# Patient Record
Sex: MALE | Race: WHITE | Employment: OTHER | URBAN - NONMETROPOLITAN AREA
[De-identification: names, ages, dates, MRNs, and addresses within clinical notes are randomized per-mention and may not be internally consistent; named-entity substitution may affect disease eponyms.]

---

## 2019-01-01 ENCOUNTER — APPOINTMENT (OUTPATIENT)
Dept: GENERAL RADIOLOGY | Age: 79
End: 2019-01-01
Attending: INTERNAL MEDICINE
Payer: COMMERCIAL

## 2019-01-01 ENCOUNTER — HOSPITAL ENCOUNTER (OUTPATIENT)
Age: 79
Setting detail: OUTPATIENT SURGERY
Discharge: OTHER INSTITUTION | End: 2019-10-14
Attending: INTERNAL MEDICINE | Admitting: INTERNAL MEDICINE
Payer: COMMERCIAL

## 2019-01-01 ENCOUNTER — HOSPITAL ENCOUNTER (OUTPATIENT)
Dept: GENERAL RADIOLOGY | Age: 79
Discharge: HOME | End: 2019-10-14
Payer: COMMERCIAL

## 2019-01-01 ENCOUNTER — HOSPITAL ENCOUNTER (OUTPATIENT)
Age: 79
Discharge: NURSING, REHAB OR LONG-TERM CARE | End: 2019-10-16
Attending: INTERNAL MEDICINE | Admitting: INTERNAL MEDICINE
Payer: COMMERCIAL

## 2019-01-01 ENCOUNTER — HOSPITAL ENCOUNTER (INPATIENT)
Age: 79
LOS: 26 days | Discharge: NURSING, REHAB OR LONG-TERM CARE | DRG: 949 | End: 2019-11-11
Attending: PHYSICAL MEDICINE & REHABILITATION | Admitting: PHYSICAL MEDICINE & REHABILITATION
Payer: MEDICARE

## 2019-01-01 ENCOUNTER — APPOINTMENT (OUTPATIENT)
Dept: GENERAL RADIOLOGY | Age: 79
DRG: 871 | End: 2019-01-01
Payer: MEDICARE

## 2019-01-01 ENCOUNTER — APPOINTMENT (OUTPATIENT)
Dept: CT IMAGING | Age: 79
DRG: 871 | End: 2019-01-01
Payer: MEDICARE

## 2019-01-01 ENCOUNTER — APPOINTMENT (OUTPATIENT)
Dept: GENERAL RADIOLOGY | Age: 79
DRG: 949 | End: 2019-01-01
Attending: PHYSICAL MEDICINE & REHABILITATION
Payer: MEDICARE

## 2019-01-01 ENCOUNTER — HOSPITAL ENCOUNTER (OUTPATIENT)
Age: 79
Discharge: HOME | End: 2019-10-14
Attending: INTERNAL MEDICINE | Admitting: INTERNAL MEDICINE
Payer: COMMERCIAL

## 2019-01-01 ENCOUNTER — OFFICE VISIT (OUTPATIENT)
Dept: CARDIOLOGY CLINIC | Age: 79
End: 2019-01-01
Payer: MEDICARE

## 2019-01-01 ENCOUNTER — HOSPITAL ENCOUNTER (INPATIENT)
Age: 79
LOS: 7 days | Discharge: NURSING, REHAB OR LONG-TERM CARE | DRG: 871 | End: 2019-11-25
Attending: EMERGENCY MEDICINE | Admitting: INTERNAL MEDICINE
Payer: MEDICARE

## 2019-01-01 ENCOUNTER — APPOINTMENT (OUTPATIENT)
Dept: ULTRASOUND IMAGING | Age: 79
End: 2019-01-01
Attending: INTERNAL MEDICINE
Payer: COMMERCIAL

## 2019-01-01 VITALS
WEIGHT: 143.9 LBS | DIASTOLIC BLOOD PRESSURE: 74 MMHG | OXYGEN SATURATION: 94 % | TEMPERATURE: 97.9 F | HEIGHT: 72 IN | RESPIRATION RATE: 18 BRPM | SYSTOLIC BLOOD PRESSURE: 124 MMHG | HEART RATE: 88 BPM | BODY MASS INDEX: 19.49 KG/M2

## 2019-01-01 VITALS
OXYGEN SATURATION: 91 % | HEIGHT: 72 IN | RESPIRATION RATE: 18 BRPM | SYSTOLIC BLOOD PRESSURE: 124 MMHG | WEIGHT: 142.1 LBS | HEART RATE: 118 BPM | DIASTOLIC BLOOD PRESSURE: 61 MMHG | TEMPERATURE: 97.9 F | BODY MASS INDEX: 19.25 KG/M2

## 2019-01-01 VITALS — HEART RATE: 102 BPM | DIASTOLIC BLOOD PRESSURE: 68 MMHG | SYSTOLIC BLOOD PRESSURE: 128 MMHG

## 2019-01-01 DIAGNOSIS — L98.9 SKIN LESION OF SCALP: Chronic | ICD-10-CM

## 2019-01-01 DIAGNOSIS — E43 SEVERE MALNUTRITION (HCC): ICD-10-CM

## 2019-01-01 DIAGNOSIS — R91.8 INFILTRATE OF RIGHT LUNG PRESENT ON CHEST X-RAY: Primary | ICD-10-CM

## 2019-01-01 DIAGNOSIS — R09.02 OXYGEN DESATURATION: ICD-10-CM

## 2019-01-01 DIAGNOSIS — F07.81 ENCEPHALOPATHY, TRAUMATIC: ICD-10-CM

## 2019-01-01 DIAGNOSIS — K94.23 GASTROSTOMY SITE LEAK (HCC): ICD-10-CM

## 2019-01-01 DIAGNOSIS — S06.5XAA SDH (SUBDURAL HEMATOMA): Primary | ICD-10-CM

## 2019-01-01 DIAGNOSIS — S06.5XAA SDH (SUBDURAL HEMATOMA): ICD-10-CM

## 2019-01-01 DIAGNOSIS — R06.02 SOB (SHORTNESS OF BREATH): Primary | ICD-10-CM

## 2019-01-01 DIAGNOSIS — I45.5 SINUS PAUSE: ICD-10-CM

## 2019-01-01 LAB
A/G RATIO: 0.8 (ref 1.5–2.5)
A/G RATIO: 1 (ref 1.5–2.5)
A/G RATIO: 1.1 (ref 1.5–2.5)
ABSOLUTE BASO #: 0 /CMM (ref 0–200)
ABSOLUTE BASO #: 100 /CMM (ref 0–200)
ABSOLUTE BASO #: 100 /CMM (ref 0–200)
ABSOLUTE EOS #: 0 /CMM (ref 0–500)
ABSOLUTE EOS #: 0 /CMM (ref 0–500)
ABSOLUTE EOS #: 100 /CMM (ref 0–500)
ABSOLUTE LYMPH #: 1000 /CMM (ref 1000–4800)
ABSOLUTE LYMPH #: 400 /CMM (ref 1000–4800)
ABSOLUTE LYMPH #: 600 /CMM (ref 1000–4800)
ABSOLUTE LYMPH #: 700 /CMM (ref 1000–4800)
ABSOLUTE LYMPH #: 800 /CMM (ref 1000–4800)
ABSOLUTE LYMPH #: 900 /CMM (ref 1000–4800)
ABSOLUTE MONO #: 400 /CMM (ref 0–800)
ABSOLUTE MONO #: 800 /CMM (ref 0–800)
ABSOLUTE MONO #: 800 /CMM (ref 0–800)
ABSOLUTE MONO #: 900 /CMM (ref 0–800)
ABSOLUTE NEUT #: 3500 /CMM (ref 1800–7700)
ABSOLUTE NEUT #: 3800 /CMM (ref 1800–7700)
ABSOLUTE NEUT #: 4000 /CMM (ref 1800–7700)
ABSOLUTE NEUT #: 6600 /CMM (ref 1800–7700)
ABSOLUTE NEUT #: ABNORMAL /CMM (ref 1800–7700)
ABSOLUTE NEUT #: ABNORMAL /CMM (ref 1800–7700)
ALBUMIN SERPL-MCNC: 3 GM/DL (ref 3.5–5)
ALBUMIN SERPL-MCNC: 3.2 G/DL (ref 3.5–5.1)
ALBUMIN SERPL-MCNC: 3.2 GM/DL (ref 3.5–5)
ALBUMIN SERPL-MCNC: 3.2 GM/DL (ref 3.5–5)
ALBUMIN SERPL-MCNC: 3.3 GM/DL (ref 3.5–5)
ALBUMIN SERPL-MCNC: 3.4 G/DL (ref 3.5–5.1)
ALBUMIN SERPL-MCNC: 3.5 G/DL (ref 3.5–5.1)
ALBUMIN SERPL-MCNC: 3.5 GM/DL (ref 3.5–5)
ALBUMIN SERPL-MCNC: 3.7 GM/DL (ref 3.5–5)
ALBUMIN SERPL-MCNC: 3.8 G/DL (ref 3.5–5.1)
ALLEN TEST: NORMAL
ALP BLD-CCNC: 151 IU/L (ref 41–137)
ALP BLD-CCNC: 158 IU/L (ref 41–137)
ALP BLD-CCNC: 171 IU/L (ref 41–137)
ALP BLD-CCNC: 82 U/L (ref 38–126)
ALP BLD-CCNC: 94 U/L (ref 38–126)
ALP BLD-CCNC: 96 IU/L (ref 41–137)
ALP BLD-CCNC: 96 U/L (ref 38–126)
ALP BLD-CCNC: 98 IU/L (ref 41–137)
ALP BLD-CCNC: 98 U/L (ref 38–126)
ALP BLD-CCNC: 99 IU/L (ref 41–137)
ALT SERPL-CCNC: 17 U/L (ref 11–66)
ALT SERPL-CCNC: 21 U/L (ref 11–66)
ALT SERPL-CCNC: 23 IU/L (ref 10–40)
ALT SERPL-CCNC: 23 IU/L (ref 10–40)
ALT SERPL-CCNC: 25 IU/L (ref 10–40)
ALT SERPL-CCNC: 40 U/L (ref 11–66)
ALT SERPL-CCNC: 45 U/L (ref 11–66)
ALT SERPL-CCNC: 67 IU/L (ref 10–40)
ALT SERPL-CCNC: 77 IU/L (ref 10–40)
ALT SERPL-CCNC: 95 IU/L (ref 10–40)
ANA SCREEN: NEGATIVE
ANION GAP SERPL CALCULATED.3IONS-SCNC: 11 MEQ/L (ref 8–16)
ANION GAP SERPL CALCULATED.3IONS-SCNC: 12 MEQ/L (ref 8–16)
ANION GAP SERPL CALCULATED.3IONS-SCNC: 13 MEQ/L (ref 8–16)
ANION GAP SERPL CALCULATED.3IONS-SCNC: 14 MEQ/L (ref 8–16)
ANION GAP SERPL CALCULATED.3IONS-SCNC: 14 MEQ/L (ref 8–16)
ANION GAP SERPL CALCULATED.3IONS-SCNC: 15 MEQ/L (ref 8–16)
ANION GAP SERPL CALCULATED.3IONS-SCNC: 17 MEQ/L (ref 8–16)
ANION GAP SERPL CALCULATED.3IONS-SCNC: 18 MEQ/L (ref 8–16)
ANION GAP SERPL CALCULATED.3IONS-SCNC: 19 MEQ/L (ref 8–16)
ANION GAP SERPL CALCULATED.3IONS-SCNC: 7 MMOL/L (ref 4–12)
ANION GAP SERPL CALCULATED.3IONS-SCNC: 7 MMOL/L (ref 4–12)
ANION GAP SERPL CALCULATED.3IONS-SCNC: 8 MMOL/L (ref 4–12)
ANION GAP SERPL CALCULATED.3IONS-SCNC: 9 MMOL/L (ref 4–12)
ANION GAP SERPL CALCULATED.3IONS-SCNC: 9 MMOL/L (ref 4–12)
APPEARANCE: ABNORMAL
AST SERPL-CCNC: 16 U/L (ref 5–40)
AST SERPL-CCNC: 19 IU/L (ref 15–41)
AST SERPL-CCNC: 20 IU/L (ref 15–41)
AST SERPL-CCNC: 20 U/L (ref 5–40)
AST SERPL-CCNC: 21 IU/L (ref 15–41)
AST SERPL-CCNC: 26 U/L (ref 5–40)
AST SERPL-CCNC: 30 U/L (ref 5–40)
AST SERPL-CCNC: 40 IU/L (ref 15–41)
AST SERPL-CCNC: 54 IU/L (ref 15–41)
AST SERPL-CCNC: 57 IU/L (ref 15–41)
BACTERIA: ABNORMAL /HPF
BASE EXCESS ARTERIAL: 1 MMOL/L (ref -2–3)
BASOPHILS # BLD: 0.4 %
BASOPHILS ABSOLUTE: 0 THOU/MM3 (ref 0–0.1)
BASOPHILS RELATIVE PERCENT: 0.2 % (ref 0–2)
BASOPHILS RELATIVE PERCENT: 0.2 % (ref 0–2)
BASOPHILS RELATIVE PERCENT: 0.8 % (ref 0–2)
BASOPHILS RELATIVE PERCENT: 0.8 % (ref 0–2)
BASOPHILS RELATIVE PERCENT: 0.9 % (ref 0–2)
BASOPHILS RELATIVE PERCENT: 1.1 % (ref 0–2)
BILIRUB SERPL-MCNC: 0.3 MG/DL (ref 0.3–1.2)
BILIRUB SERPL-MCNC: 0.4 MG/DL (ref 0.2–1)
BILIRUB SERPL-MCNC: 0.5 MG/DL (ref 0.2–1)
BILIRUB SERPL-MCNC: 0.5 MG/DL (ref 0.2–1)
BILIRUB SERPL-MCNC: 0.7 MG/DL (ref 0.2–1)
BILIRUB SERPL-MCNC: 0.7 MG/DL (ref 0.2–1)
BILIRUB SERPL-MCNC: 0.7 MG/DL (ref 0.3–1.2)
BILIRUB SERPL-MCNC: 1.3 MG/DL (ref 0.2–1)
BILIRUBIN DIRECT: 0.1 MG/DL (ref 0.1–0.2)
BILIRUBIN DIRECT: 0.2 MG/DL (ref 0.1–0.2)
BILIRUBIN DIRECT: 0.3 MG/DL (ref 0.1–0.2)
BILIRUBIN URINE: NEGATIVE
BLOOD CULTURE, ROUTINE: NORMAL
BLOOD, URINE: ABNORMAL
BUN BLDV-MCNC: 13 MG/DL (ref 7–22)
BUN BLDV-MCNC: 14 MG/DL (ref 7–20)
BUN BLDV-MCNC: 14 MG/DL (ref 7–22)
BUN BLDV-MCNC: 15 MG/DL
BUN BLDV-MCNC: 18 MG/DL (ref 7–22)
BUN BLDV-MCNC: 19 MG/DL (ref 7–20)
BUN BLDV-MCNC: 20 MG/DL (ref 7–22)
BUN BLDV-MCNC: 20 MG/DL (ref 7–22)
BUN BLDV-MCNC: 25 MG/DL (ref 7–20)
BUN BLDV-MCNC: 25 MG/DL (ref 7–22)
BUN BLDV-MCNC: 26 MG/DL (ref 7–20)
BUN BLDV-MCNC: 27 MG/DL (ref 7–20)
BUN BLDV-MCNC: 36 MG/DL (ref 7–22)
BUN BLDV-MCNC: 69 MG/DL (ref 7–22)
BUN BLDV-MCNC: 70 MG/DL (ref 7–22)
BUN BLDV-MCNC: 84 MG/DL (ref 7–22)
BUN BLDV-MCNC: 88 MG/DL (ref 7–22)
C DIFF TOXIN/ANTIGEN: NEGATIVE
CALCIUM SERPL-MCNC: 8.5 MG/DL (ref 8.5–10.5)
CALCIUM SERPL-MCNC: 8.6 MG/DL (ref 8.5–10.5)
CALCIUM SERPL-MCNC: 8.6 MG/DL (ref 8.8–10.5)
CALCIUM SERPL-MCNC: 8.7 MG/DL (ref 8.5–10.5)
CALCIUM SERPL-MCNC: 9.1 MG/DL
CALCIUM SERPL-MCNC: 9.1 MG/DL (ref 8.8–10.5)
CALCIUM SERPL-MCNC: 9.2 MG/DL (ref 8.5–10.5)
CALCIUM SERPL-MCNC: 9.2 MG/DL (ref 8.5–10.5)
CALCIUM SERPL-MCNC: 9.2 MG/DL (ref 8.8–10.5)
CALCIUM SERPL-MCNC: 9.3 MG/DL (ref 8.5–10.5)
CALCIUM SERPL-MCNC: 9.4 MG/DL (ref 8.5–10.5)
CALCIUM SERPL-MCNC: 9.4 MG/DL (ref 8.8–10.5)
CALCIUM SERPL-MCNC: 9.6 MG/DL (ref 8.5–10.5)
CALCIUM SERPL-MCNC: 9.7 MG/DL (ref 8.5–10.5)
CALCIUM SERPL-MCNC: 9.7 MG/DL (ref 8.5–10.5)
CALCIUM SERPL-MCNC: 9.8 MG/DL (ref 8.8–10.5)
CASTS 2: ABNORMAL /LPF
CASTS UA: ABNORMAL /LPF
CERULOPLASMIN: 38 MG/DL (ref 19–31)
CHARACTER, URINE: ABNORMAL
CHARACTER, URINE: CLEAR
CHLORIDE BLD-SCNC: 102 MEQ/L (ref 98–111)
CHLORIDE BLD-SCNC: 102 MEQ/L (ref 98–111)
CHLORIDE BLD-SCNC: 103 MEQ/L (ref 101–111)
CHLORIDE BLD-SCNC: 103 MEQ/L (ref 98–111)
CHLORIDE BLD-SCNC: 103 MEQ/L (ref 98–111)
CHLORIDE BLD-SCNC: 104 MMOL/L
CHLORIDE BLD-SCNC: 105 MEQ/L (ref 98–111)
CHLORIDE BLD-SCNC: 106 MEQ/L (ref 101–111)
CHLORIDE BLD-SCNC: 106 MEQ/L (ref 101–111)
CHLORIDE BLD-SCNC: 107 MEQ/L (ref 101–111)
CHLORIDE BLD-SCNC: 107 MEQ/L (ref 98–111)
CHLORIDE BLD-SCNC: 107 MEQ/L (ref 98–111)
CHLORIDE BLD-SCNC: 110 MEQ/L (ref 98–111)
CHLORIDE BLD-SCNC: 110 MEQ/L (ref 98–111)
CHLORIDE BLD-SCNC: 114 MEQ/L (ref 98–111)
CHLORIDE BLD-SCNC: 120 MEQ/L (ref 98–111)
CHLORIDE BLD-SCNC: 99 MEQ/L (ref 101–111)
CHLORIDE BLD-SCNC: 99 MEQ/L (ref 98–111)
CHLORIDE, URINE: 45 MEQ/L
CLOSTRIDIUM DIFFICILE, PCR: NEGATIVE
CO2: 17 MEQ/L (ref 23–33)
CO2: 18 MEQ/L (ref 23–33)
CO2: 19 MEQ/L (ref 23–33)
CO2: 19 MEQ/L (ref 23–33)
CO2: 20 MEQ/L (ref 23–33)
CO2: 21 MEQ/L (ref 23–33)
CO2: 21 MEQ/L (ref 23–33)
CO2: 22 MEQ/L (ref 23–33)
CO2: 22 MEQ/L (ref 23–33)
CO2: 23 MEQ/L (ref 21–32)
CO2: 23 MEQ/L (ref 23–33)
CO2: 24 MEQ/L (ref 21–32)
CO2: 24 MEQ/L (ref 21–32)
CO2: 24 MEQ/L (ref 23–33)
CO2: 24 MMOL/L
CO2: 25 MEQ/L (ref 21–32)
CO2: 26 MEQ/L (ref 21–32)
COLOR: ABNORMAL
COLOR: YELLOW
CREAT SERPL-MCNC: 0.46 MG/DL (ref 0.6–1.3)
CREAT SERPL-MCNC: 0.47 MG/DL (ref 0.6–1.3)
CREAT SERPL-MCNC: 0.5 MG/DL (ref 0.4–1.2)
CREAT SERPL-MCNC: 0.58 MG/DL (ref 0.6–1.3)
CREAT SERPL-MCNC: 0.59 MG/DL (ref 0.6–1.3)
CREAT SERPL-MCNC: 0.6 MG/DL (ref 0.4–1.2)
CREAT SERPL-MCNC: 0.64 MG/DL (ref 0.6–1.3)
CREAT SERPL-MCNC: 0.7 MG/DL (ref 0.4–1.2)
CREAT SERPL-MCNC: 0.7 MG/DL (ref 0.4–1.2)
CREAT SERPL-MCNC: 0.8 MG/DL
CREAT SERPL-MCNC: 1 MG/DL (ref 0.4–1.2)
CREAT SERPL-MCNC: 1.2 MG/DL (ref 0.4–1.2)
CREAT SERPL-MCNC: 1.6 MG/DL (ref 0.4–1.2)
CREAT SERPL-MCNC: 2.2 MG/DL (ref 0.4–1.2)
CREAT SERPL-MCNC: 3.6 MG/DL (ref 0.4–1.2)
CREATININE CLEARANCE: >60
CRYSTALS, UA: ABNORMAL
DEVICE: NORMAL
DRAWN BY: NORMAL
EOSINOPHIL # BLD: 0.6 %
EOSINOPHILS ABSOLUTE: 0 THOU/MM3 (ref 0–0.4)
EOSINOPHILS RELATIVE PERCENT: 0 % (ref 0–6)
EOSINOPHILS RELATIVE PERCENT: 0.5 % (ref 0–6)
EOSINOPHILS RELATIVE PERCENT: 0.6 % (ref 0–6)
EOSINOPHILS RELATIVE PERCENT: 1 % (ref 0–6)
EOSINOPHILS RELATIVE PERCENT: 2.1 % (ref 0–6)
EOSINOPHILS RELATIVE PERCENT: 2.3 % (ref 0–6)
EPITHELIAL CELLS, UA: ABNORMAL /HPF
ERYTHROCYTE [DISTWIDTH] IN BLOOD BY AUTOMATED COUNT: 12.9 % (ref 11.5–14.5)
ERYTHROCYTE [DISTWIDTH] IN BLOOD BY AUTOMATED COUNT: 13.1 % (ref 11.5–14.5)
ERYTHROCYTE [DISTWIDTH] IN BLOOD BY AUTOMATED COUNT: 13.2 % (ref 11.5–14.5)
ERYTHROCYTE [DISTWIDTH] IN BLOOD BY AUTOMATED COUNT: 13.4 % (ref 11.5–14.5)
ERYTHROCYTE [DISTWIDTH] IN BLOOD BY AUTOMATED COUNT: 13.4 % (ref 11.5–14.5)
ERYTHROCYTE [DISTWIDTH] IN BLOOD BY AUTOMATED COUNT: 13.5 % (ref 11.5–14.5)
ERYTHROCYTE [DISTWIDTH] IN BLOOD BY AUTOMATED COUNT: 13.6 % (ref 11.5–14.5)
ERYTHROCYTE [DISTWIDTH] IN BLOOD BY AUTOMATED COUNT: 13.6 % (ref 11.5–14.5)
ERYTHROCYTE [DISTWIDTH] IN BLOOD BY AUTOMATED COUNT: 13.8 % (ref 11.5–14.5)
ERYTHROCYTE [DISTWIDTH] IN BLOOD BY AUTOMATED COUNT: 14.3 % (ref 11.5–14.5)
ERYTHROCYTE [DISTWIDTH] IN BLOOD BY AUTOMATED COUNT: 42.4 FL (ref 35–45)
ERYTHROCYTE [DISTWIDTH] IN BLOOD BY AUTOMATED COUNT: 44.3 FL (ref 35–45)
ERYTHROCYTE [DISTWIDTH] IN BLOOD BY AUTOMATED COUNT: 44.4 FL (ref 35–45)
ERYTHROCYTE [DISTWIDTH] IN BLOOD BY AUTOMATED COUNT: 44.4 FL (ref 35–45)
ERYTHROCYTE [DISTWIDTH] IN BLOOD BY AUTOMATED COUNT: 44.8 FL (ref 35–45)
ERYTHROCYTE [DISTWIDTH] IN BLOOD BY AUTOMATED COUNT: 45.9 FL (ref 35–45)
ERYTHROCYTE [DISTWIDTH] IN BLOOD BY AUTOMATED COUNT: 46.7 FL (ref 35–45)
ERYTHROCYTE [DISTWIDTH] IN BLOOD BY AUTOMATED COUNT: 47.4 FL (ref 35–45)
ERYTHROCYTE [DISTWIDTH] IN BLOOD BY AUTOMATED COUNT: 47.5 FL (ref 35–45)
ERYTHROCYTE [DISTWIDTH] IN BLOOD BY AUTOMATED COUNT: 49.2 FL (ref 35–45)
FERRITIN: 574 NG/ML (ref 24–336)
FIO2: 0.28
FLU A ANTIGEN: NEGATIVE
FLU B ANTIGEN: NEGATIVE
GFR CALCULATED: 93
GFR SERPL CREATININE-BSD FRML MDRD: 16 ML/MIN/1.73M2
GFR SERPL CREATININE-BSD FRML MDRD: 29 ML/MIN/1.73M2
GFR SERPL CREATININE-BSD FRML MDRD: 42 ML/MIN/1.73M2
GFR SERPL CREATININE-BSD FRML MDRD: 58 ML/MIN/1.73M2
GFR SERPL CREATININE-BSD FRML MDRD: 72 ML/MIN/1.73M2
GFR SERPL CREATININE-BSD FRML MDRD: > 90 ML/MIN/1.73M2
GLUCOSE BLD-MCNC: 100 MG/DL (ref 70–108)
GLUCOSE BLD-MCNC: 102 MG/DL (ref 70–108)
GLUCOSE BLD-MCNC: 105 MG/DL (ref 70–108)
GLUCOSE BLD-MCNC: 105 MG/DL (ref 70–108)
GLUCOSE BLD-MCNC: 106 MG/DL (ref 70–108)
GLUCOSE BLD-MCNC: 108 MG/DL (ref 70–108)
GLUCOSE BLD-MCNC: 109 MG/DL (ref 70–108)
GLUCOSE BLD-MCNC: 110 MG/DL (ref 70–108)
GLUCOSE BLD-MCNC: 111 MG/DL (ref 70–108)
GLUCOSE BLD-MCNC: 111 MG/DL (ref 70–108)
GLUCOSE BLD-MCNC: 118 MG/DL (ref 70–108)
GLUCOSE BLD-MCNC: 121 MG/DL (ref 70–108)
GLUCOSE BLD-MCNC: 122 MG/DL (ref 70–108)
GLUCOSE BLD-MCNC: 123 MG/DL (ref 70–108)
GLUCOSE BLD-MCNC: 124 MG/DL (ref 70–108)
GLUCOSE BLD-MCNC: 135 MG/DL (ref 70–108)
GLUCOSE BLD-MCNC: 135 MG/DL (ref 70–108)
GLUCOSE BLD-MCNC: 142 MG/DL
GLUCOSE BLD-MCNC: 162 MG/DL (ref 70–108)
GLUCOSE URINE: NEGATIVE
GLUCOSE URINE: NEGATIVE MG/DL
GLUCOSE: 102 MG/DL (ref 70–110)
GLUCOSE: 111 MG/DL (ref 70–110)
GLUCOSE: 125 MG/DL (ref 70–110)
GLUCOSE: 155 MG/DL (ref 70–110)
GLUCOSE: 82 MG/DL (ref 70–110)
HAV AB SERPL IA-ACNC: REACTIVE
HAV IGM SER IA-ACNC: NONREACTIVE
HBV SURFACE AB TITR SER: NONREACTIVE {TITER}
HCO3: 25.7 MMOL/L (ref 22–26)
HCT VFR BLD CALC: 28.8 % (ref 40–49)
HCT VFR BLD CALC: 29.4 % (ref 40–49)
HCT VFR BLD CALC: 31.9 % (ref 40–49)
HCT VFR BLD CALC: 32.2 % (ref 40–49)
HCT VFR BLD CALC: 32.8 % (ref 42–52)
HCT VFR BLD CALC: 33.9 % (ref 42–52)
HCT VFR BLD CALC: 34.7 % (ref 42–52)
HCT VFR BLD CALC: 35.2 % (ref 42–52)
HCT VFR BLD CALC: 35.9 % (ref 40–49)
HCT VFR BLD CALC: 36.2 % (ref 42–52)
HCT VFR BLD CALC: 36.9 % (ref 42–52)
HCT VFR BLD CALC: 37 % (ref 40–49)
HCT VFR BLD CALC: 37.2 % (ref 42–52)
HCT VFR BLD CALC: 38.4 % (ref 42–52)
HCV AB: NONREACTIVE
HEMOGLOBIN: 10.2 GM/DL (ref 14–18)
HEMOGLOBIN: 10.5 GM/DL (ref 13.5–16.5)
HEMOGLOBIN: 10.5 GM/DL (ref 14–18)
HEMOGLOBIN: 10.6 GM/DL (ref 13.5–16.5)
HEMOGLOBIN: 10.6 GM/DL (ref 14–18)
HEMOGLOBIN: 11.1 GM/DL (ref 14–18)
HEMOGLOBIN: 11.2 GM/DL (ref 14–18)
HEMOGLOBIN: 11.2 GM/DL (ref 14–18)
HEMOGLOBIN: 11.3 GM/DL (ref 14–18)
HEMOGLOBIN: 11.7 GM/DL (ref 14–18)
HEMOGLOBIN: 11.8 GM/DL (ref 13.5–16.5)
HEMOGLOBIN: 11.8 GM/DL (ref 14–18)
HEMOGLOBIN: 12.1 GM/DL (ref 13.5–16.5)
HEMOGLOBIN: 12.1 GM/DL (ref 14–18)
HEMOGLOBIN: 9.7 GM/DL (ref 13.5–16.5)
HEMOGLOBIN: 9.8 GM/DL (ref 13.5–16.5)
HEPATITIS B CORE TOTAL ANTIBODY: NONREACTIVE
HEPATITIS B SURFACE ANTIGEN: NONREACTIVE
IMMATURE GRANS (ABS): 0.05 THOU/MM3 (ref 0–0.07)
IMMATURE GRANULOCYTES: 0.7 %
IRON SATURATION: 5 % (ref 20–50)
IRON, SERUM: 18 MCG/DL (ref 45–182)
IRON, SERUM: 20 MCG/DL (ref 45–182)
KETONES, URINE: NEGATIVE
LACTIC ACID: 1.8 MMOL/L (ref 0.5–2.2)
LEUKOCYTE ESTERASE, URINE: ABNORMAL
LEUKOCYTE ESTERASE, URINE: NEGATIVE
LEUKOCYTES, UA: ABNORMAL
LV EF: 45 %
LVEF MODALITY: NORMAL
LYMPHOCYTES # BLD: 12 %
LYMPHOCYTES ABSOLUTE: 0.8 THOU/MM3 (ref 1–4.8)
LYMPHOCYTES RELATIVE PERCENT: 15.1 % (ref 15–45)
LYMPHOCYTES RELATIVE PERCENT: 17.3 % (ref 15–45)
LYMPHOCYTES RELATIVE PERCENT: 20.2 % (ref 15–45)
LYMPHOCYTES RELATIVE PERCENT: 3 % (ref 15–45)
LYMPHOCYTES RELATIVE PERCENT: 5.5 % (ref 15–45)
LYMPHOCYTES RELATIVE PERCENT: 7.1 % (ref 15–45)
MAGNESIUM: 2.3 MG/DL (ref 1.6–2.4)
MAGNESIUM: 2.3 MG/DL (ref 1.8–2.5)
MCH RBC QN AUTO: 28.3 PG (ref 26–33)
MCH RBC QN AUTO: 28.3 PG (ref 26–33)
MCH RBC QN AUTO: 28.4 PG (ref 26–33)
MCH RBC QN AUTO: 28.9 PG (ref 26–33)
MCH RBC QN AUTO: 28.9 PG (ref 26–33)
MCH RBC QN AUTO: 29.2 PG (ref 26–33)
MCH RBC QN AUTO: 29.2 PG (ref 26–33)
MCH RBC QN AUTO: 29.4 PG (ref 26–33)
MCH RBC QN AUTO: 29.6 PG (ref 26–33)
MCH RBC QN AUTO: 30 PG (ref 26–33)
MCH RBC QN AUTO: 30.1 PG (ref 27.5–33)
MCH RBC QN AUTO: 30.3 PG (ref 27.5–33)
MCH RBC QN AUTO: 30.8 PG (ref 27.5–33)
MCH RBC QN AUTO: 31.2 PG (ref 27.5–33)
MCHC RBC AUTO-ENTMCNC: 30.3 GM/DL (ref 32.2–35.5)
MCHC RBC AUTO-ENTMCNC: 30.4 GM/DL (ref 32.2–35.5)
MCHC RBC AUTO-ENTMCNC: 31.1 GM/DL (ref 32.2–35.5)
MCHC RBC AUTO-ENTMCNC: 31.2 GM/DL (ref 32.2–35.5)
MCHC RBC AUTO-ENTMCNC: 31.3 GM/DL (ref 32.2–35.5)
MCHC RBC AUTO-ENTMCNC: 31.5 GM/DL (ref 32.2–35.5)
MCHC RBC AUTO-ENTMCNC: 31.8 GM/DL (ref 32.2–35.5)
MCHC RBC AUTO-ENTMCNC: 32.8 GM/DL (ref 33–36)
MCHC RBC AUTO-ENTMCNC: 32.9 GM/DL (ref 33–36)
MCHC RBC AUTO-ENTMCNC: 33 GM/DL (ref 33–36)
MCHC RBC AUTO-ENTMCNC: 33 GM/DL (ref 33–36)
MCHC RBC AUTO-ENTMCNC: 33.4 GM/DL (ref 33–36)
MCHC RBC AUTO-ENTMCNC: 33.5 GM/DL (ref 32.2–35.5)
MCHC RBC AUTO-ENTMCNC: 33.7 GM/DL (ref 33–36)
MCV RBC AUTO: 86.1 FL (ref 80–94)
MCV RBC AUTO: 90.6 FL (ref 80–94)
MCV RBC AUTO: 91.3 CU MIC (ref 80–97)
MCV RBC AUTO: 91.4 FL (ref 80–94)
MCV RBC AUTO: 91.7 CU MIC (ref 80–97)
MCV RBC AUTO: 91.7 CU MIC (ref 80–97)
MCV RBC AUTO: 91.9 CU MIC (ref 80–97)
MCV RBC AUTO: 92.4 CU MIC (ref 80–97)
MCV RBC AUTO: 92.7 CU MIC (ref 80–97)
MCV RBC AUTO: 92.8 FL (ref 80–94)
MCV RBC AUTO: 93.2 FL (ref 80–94)
MCV RBC AUTO: 93.5 FL (ref 80–94)
MCV RBC AUTO: 93.5 FL (ref 80–94)
MCV RBC AUTO: 93.9 FL (ref 80–94)
MCV RBC AUTO: 94.4 FL (ref 80–94)
MCV RBC AUTO: 95.3 FL (ref 80–94)
MISCELLANEOUS 2: ABNORMAL
MITOCHONDRIAL ANTIBODY: <0.1 U
MONOCYTES # BLD: 11.6 %
MONOCYTES ABSOLUTE: 0.8 THOU/MM3 (ref 0.4–1.3)
MONOCYTES RELATIVE PERCENT: 6.3 % (ref 2–10)
MONOCYTES RELATIVE PERCENT: 6.6 % (ref 2–10)
MONOCYTES RELATIVE PERCENT: 6.8 % (ref 2–10)
MONOCYTES RELATIVE PERCENT: 7.9 % (ref 2–10)
MONOCYTES RELATIVE PERCENT: 8.1 % (ref 2–10)
MONOCYTES RELATIVE PERCENT: 9.7 % (ref 2–10)
MRSA SCREEN RT-PCR: NEGATIVE
MRSA SCREEN: NORMAL
MUCUS: PRESENT
NEUTROPHILS RELATIVE PERCENT: 69.8 % (ref 40–70)
NEUTROPHILS RELATIVE PERCENT: 73.2 % (ref 40–70)
NEUTROPHILS RELATIVE PERCENT: 73.6 % (ref 40–70)
NEUTROPHILS RELATIVE PERCENT: 81.8 % (ref 40–70)
NEUTROPHILS RELATIVE PERCENT: 87 % (ref 40–70)
NEUTROPHILS RELATIVE PERCENT: 90.5 % (ref 40–70)
NITRITE, URINE: NEGATIVE
NITRITE, URINE: POSITIVE
NUCLEATED RBCS: 0 /100 WBC
NUCLEATED RBCS: 0.1 /100 WBC
NUCLEATED RED BLOOD CELLS: 0 /100 WBC
O2 SAT, ARTERIAL: 97 % (ref 95–98)
ORGANISM: ABNORMAL
OSMOLALITY CALCULATION: 306.5 MOSMOL/KG (ref 275–300)
PCO2: 38 MMHG (ref 35–45)
PDW BLD-RTO: 13.2 % (ref 12–16)
PDW BLD-RTO: 13.4 % (ref 12–16)
PDW BLD-RTO: 13.8 % (ref 12–16)
PDW BLD-RTO: 13.8 % (ref 12–16)
PDW BLD-RTO: 13.9 % (ref 12–16)
PDW BLD-RTO: 14 % (ref 12–16)
PH UA: 5 (ref 5–9)
PH UA: 5.5 (ref 5–9)
PH UA: 5.5 (ref 5–9)
PH UA: 7.5 (ref 5–9)
PH, URINE: 7 (ref 5–8)
PH: 7.43 (ref 7.35–7.45)
PHOSPHORUS: 4.2 MG/DL (ref 2.4–4.7)
PLATELET # BLD: 229 THOU/MM3 (ref 130–400)
PLATELET # BLD: 238 THOU/MM3 (ref 130–400)
PLATELET # BLD: 238 THOU/MM3 (ref 130–400)
PLATELET # BLD: 270 THOU/MM3 (ref 130–400)
PLATELET # BLD: 290 THOU/MM3 (ref 130–400)
PLATELET # BLD: 308 THOU/MM3 (ref 130–400)
PLATELET # BLD: 312 THOU/MM3 (ref 130–400)
PLATELET # BLD: 315 THOU/MM3 (ref 130–400)
PLATELET # BLD: 317 THOU/MM3 (ref 130–400)
PLATELET # BLD: 340 THOU/MM3 (ref 130–400)
PLATELET # BLD: 343 TH/CMM (ref 150–400)
PLATELET # BLD: 349 TH/CMM (ref 150–400)
PLATELET # BLD: 357 TH/CMM (ref 150–400)
PLATELET # BLD: 661 TH/CMM (ref 150–400)
PLATELET # BLD: 759 TH/CMM (ref 150–400)
PLATELET # BLD: 780 TH/CMM (ref 150–400)
PMV BLD AUTO: 8.6 FL (ref 9.4–12.4)
PMV BLD AUTO: 8.7 FL (ref 9.4–12.4)
PMV BLD AUTO: 8.8 FL (ref 9.4–12.4)
PMV BLD AUTO: 8.8 FL (ref 9.4–12.4)
PMV BLD AUTO: 8.9 FL (ref 9.4–12.4)
PMV BLD AUTO: 9.1 FL (ref 9.4–12.4)
PMV BLD AUTO: 9.3 FL (ref 9.4–12.4)
PMV BLD AUTO: 9.5 FL (ref 9.4–12.4)
PMV BLD AUTO: 9.5 FL (ref 9.4–12.4)
PMV BLD AUTO: 9.6 FL (ref 9.4–12.4)
PO2: 85 MMHG (ref 80–105)
POTASSIUM REFLEX MAGNESIUM: 4.1 MEQ/L (ref 3.5–5.2)
POTASSIUM SERPL-SCNC: 3.4 MEQ/L (ref 3.5–5.2)
POTASSIUM SERPL-SCNC: 3.5 MEQ/L (ref 3.5–5.2)
POTASSIUM SERPL-SCNC: 3.5 MEQ/L (ref 3.6–5)
POTASSIUM SERPL-SCNC: 3.6 MEQ/L (ref 3.5–5.2)
POTASSIUM SERPL-SCNC: 3.8 MEQ/L (ref 3.5–5.2)
POTASSIUM SERPL-SCNC: 4 MEQ/L (ref 3.6–5)
POTASSIUM SERPL-SCNC: 4.1 MEQ/L (ref 3.5–5.2)
POTASSIUM SERPL-SCNC: 4.1 MEQ/L (ref 3.5–5.2)
POTASSIUM SERPL-SCNC: 4.1 MEQ/L (ref 3.6–5)
POTASSIUM SERPL-SCNC: 4.2 MEQ/L (ref 3.5–5.2)
POTASSIUM SERPL-SCNC: 4.2 MEQ/L (ref 3.5–5.2)
POTASSIUM SERPL-SCNC: 4.2 MEQ/L (ref 3.6–5)
POTASSIUM SERPL-SCNC: 4.3 MEQ/L (ref 3.5–5.2)
POTASSIUM SERPL-SCNC: 4.3 MEQ/L (ref 3.5–5.2)
POTASSIUM SERPL-SCNC: 4.4 MEQ/L (ref 3.5–5.2)
POTASSIUM SERPL-SCNC: 4.4 MMOL/L
POTASSIUM SERPL-SCNC: 4.6 MEQ/L (ref 3.5–5.2)
POTASSIUM SERPL-SCNC: 4.6 MEQ/L (ref 3.5–5.2)
POTASSIUM SERPL-SCNC: 4.6 MEQ/L (ref 3.6–5)
POTASSIUM, URINE: 23.7 MEQ/L
PROTEIN UA: 100
PROTEIN UA: 30
PROTEIN UA: ABNORMAL
PROTEIN UA: NEGATIVE
PROTEIN, URINE: ABNORMAL
RBC # BLD: 3.11 MIL/CMM (ref 4.5–6)
RBC # BLD: 3.19 MIL/CMM (ref 4.5–6)
RBC # BLD: 3.48 MIL/CMM (ref 4.5–6)
RBC # BLD: 3.53 MIL/CMM (ref 4.5–6)
RBC # BLD: 3.59 MILL/MM3 (ref 4.7–6.1)
RBC # BLD: 3.71 MILL/MM3 (ref 4.7–6.1)
RBC # BLD: 3.73 MILL/MM3 (ref 4.7–6.1)
RBC # BLD: 3.74 MILL/MM3 (ref 4.7–6.1)
RBC # BLD: 3.75 MILL/MM3 (ref 4.7–6.1)
RBC # BLD: 3.87 MILL/MM3 (ref 4.7–6.1)
RBC # BLD: 3.87 MILL/MM3 (ref 4.7–6.1)
RBC # BLD: 3.92 MIL/CMM (ref 4.5–6)
RBC # BLD: 4.01 MILL/MM3 (ref 4.7–6.1)
RBC # BLD: 4.03 MIL/CMM (ref 4.5–6)
RBC # BLD: 4.09 MILL/MM3 (ref 4.7–6.1)
RBC # BLD: 4.12 MILL/MM3 (ref 4.7–6.1)
RBC URINE: >100 /HPF
RBC URINE: ABNORMAL /HPF
REASON FOR REJECTION: NORMAL
REJECTED TEST: NORMAL
RENAL EPITHELIAL, UA: ABNORMAL
SEG NEUTROPHILS: 74.7 %
SEGMENTED NEUTROPHILS ABSOLUTE COUNT: 5.1 THOU/MM3 (ref 1.8–7.7)
SITE: NORMAL
SMOOTH MUSCLE ANTIBODY: NEGATIVE
SODIUM BLD-SCNC: 133 MEQ/L (ref 135–145)
SODIUM BLD-SCNC: 135 MEQ/L (ref 135–145)
SODIUM BLD-SCNC: 136 MEQ/L (ref 135–145)
SODIUM BLD-SCNC: 136 MEQ/L (ref 135–145)
SODIUM BLD-SCNC: 136 MMOL/L
SODIUM BLD-SCNC: 137 MEQ/L (ref 135–145)
SODIUM BLD-SCNC: 137 MEQ/L (ref 135–145)
SODIUM BLD-SCNC: 138 MEQ/L (ref 135–145)
SODIUM BLD-SCNC: 139 MEQ/L (ref 135–145)
SODIUM BLD-SCNC: 139 MEQ/L (ref 135–145)
SODIUM BLD-SCNC: 140 MEQ/L (ref 135–145)
SODIUM BLD-SCNC: 141 MEQ/L (ref 135–145)
SODIUM BLD-SCNC: 141 MEQ/L (ref 135–145)
SODIUM BLD-SCNC: 143 MEQ/L (ref 135–145)
SODIUM BLD-SCNC: 144 MEQ/L (ref 135–145)
SODIUM BLD-SCNC: 145 MEQ/L (ref 135–145)
SODIUM BLD-SCNC: 150 MEQ/L (ref 135–145)
SODIUM BLD-SCNC: 153 MEQ/L (ref 135–145)
SODIUM URINE: 79 MEQ/L
SPECIFIC GRAVITY, URINE: 1.01 (ref 1–1.03)
SPECIFIC GRAVITY, URINE: 1.01 (ref 1–1.03)
SPECIFIC GRAVITY, URINE: 1.02 (ref 1–1.03)
SQUAMOUS EPITHELIAL: ABNORMAL /HPF
T4 TOTAL: 9.6 UG/DL (ref 4.5–12)
TOTAL PROTEIN: 6.5 G/DL (ref 6.2–8)
TOTAL PROTEIN: 6.6 G/DL (ref 6.1–8)
TOTAL PROTEIN: 6.7 G/DL (ref 6.2–8)
TOTAL PROTEIN: 6.9 G/DL (ref 6.1–8)
TOTAL PROTEIN: 6.9 G/DL (ref 6.2–8)
TOTAL PROTEIN: 7 G/DL (ref 6.1–8)
TOTAL PROTEIN: 7.1 G/DL (ref 6.2–8)
TOTAL PROTEIN: 7.2 G/DL (ref 6.1–8)
TOTAL PROTEIN: 7.2 G/DL (ref 6.2–8)
TOTAL PROTEIN: 7.3 G/DL (ref 6.2–8)
TRANSFERRIN: 219 MG/DL (ref 180–329)
TSH SERPL DL<=0.05 MIU/L-ACNC: 1.52 MCIU/ML (ref 0.49–4.67)
URINALYSIS REFLEX: YES
URINE CULTURE REFLEX: ABNORMAL
URINE CULTURE REFLEX: NORMAL
URINE HGB: ABNORMAL
UROBILINOGEN, URINE: 0.2 EU/DL (ref 0–1)
UROBILINOGEN, URINE: 1 EU/DL (ref 0–1)
UROBILINOGEN, URINE: ABNORMAL (ref 0.2–1)
VANCOMYCIN RESISTANT ENTEROCOCCUS: NEGATIVE
VITAMIN D 25-HYDROXY: 28 NG/ML (ref 30–100)
VRE CULTURE: NORMAL
WBC # BLD: 12.9 TH/CMM (ref 4.4–10.5)
WBC # BLD: 13 TH/CMM (ref 4.4–10.5)
WBC # BLD: 3.8 THOU/MM3 (ref 4.8–10.8)
WBC # BLD: 4.2 THOU/MM3 (ref 4.8–10.8)
WBC # BLD: 4.9 THOU/MM3 (ref 4.8–10.8)
WBC # BLD: 5 TH/CMM (ref 4.4–10.5)
WBC # BLD: 5 THOU/MM3 (ref 4.8–10.8)
WBC # BLD: 5.1 TH/CMM (ref 4.4–10.5)
WBC # BLD: 5.1 THOU/MM3 (ref 4.8–10.8)
WBC # BLD: 5.4 TH/CMM (ref 4.4–10.5)
WBC # BLD: 5.5 THOU/MM3 (ref 4.8–10.8)
WBC # BLD: 5.6 THOU/MM3 (ref 4.8–10.8)
WBC # BLD: 6.1 THOU/MM3 (ref 4.8–10.8)
WBC # BLD: 6.1 THOU/MM3 (ref 4.8–10.8)
WBC # BLD: 6.8 THOU/MM3 (ref 4.8–10.8)
WBC # BLD: 8 TH/CMM (ref 4.4–10.5)
WBC UA: > 100 /HPF
WBC UA: ABNORMAL /HPF
WBC URINE: ABNORMAL /HPF
YEAST: ABNORMAL

## 2019-01-01 PROCEDURE — 6360000002 HC RX W HCPCS: Performed by: PHYSICAL MEDICINE & REHABILITATION

## 2019-01-01 PROCEDURE — 1180000000 HC REHAB R&B

## 2019-01-01 PROCEDURE — 6360000002 HC RX W HCPCS: Performed by: NURSE PRACTITIONER

## 2019-01-01 PROCEDURE — 80048 BASIC METABOLIC PNL TOTAL CA: CPT

## 2019-01-01 PROCEDURE — 71045 X-RAY EXAM CHEST 1 VIEW: CPT

## 2019-01-01 PROCEDURE — 97530 THERAPEUTIC ACTIVITIES: CPT

## 2019-01-01 PROCEDURE — 6370000000 HC RX 637 (ALT 250 FOR IP): Performed by: PHYSICAL MEDICINE & REHABILITATION

## 2019-01-01 PROCEDURE — 36415 COLL VENOUS BLD VENIPUNCTURE: CPT

## 2019-01-01 PROCEDURE — 2580000003 HC RX 258: Performed by: INTERNAL MEDICINE

## 2019-01-01 PROCEDURE — 97116 GAIT TRAINING THERAPY: CPT

## 2019-01-01 PROCEDURE — 1111F DSCHRG MED/CURRENT MED MERGE: CPT | Performed by: PHYSICIAN ASSISTANT

## 2019-01-01 PROCEDURE — 6370000000 HC RX 637 (ALT 250 FOR IP): Performed by: NURSE PRACTITIONER

## 2019-01-01 PROCEDURE — 2709999900 HC NON-CHARGEABLE SUPPLY

## 2019-01-01 PROCEDURE — 6370000000 HC RX 637 (ALT 250 FOR IP): Performed by: INTERNAL MEDICINE

## 2019-01-01 PROCEDURE — 6360000002 HC RX W HCPCS: Performed by: INTERNAL MEDICINE

## 2019-01-01 PROCEDURE — 3609013300 HC EGD TUBE PLACEMENT: Performed by: INTERNAL MEDICINE

## 2019-01-01 PROCEDURE — 85027 COMPLETE CBC AUTOMATED: CPT

## 2019-01-01 PROCEDURE — 97110 THERAPEUTIC EXERCISES: CPT

## 2019-01-01 PROCEDURE — 71275 CT ANGIOGRAPHY CHEST: CPT

## 2019-01-01 PROCEDURE — 92526 ORAL FUNCTION THERAPY: CPT

## 2019-01-01 PROCEDURE — 74230 X-RAY XM SWLNG FUNCJ C+: CPT

## 2019-01-01 PROCEDURE — 92507 TX SP LANG VOICE COMM INDIV: CPT

## 2019-01-01 PROCEDURE — 6360000004 HC RX CONTRAST MEDICATION: Performed by: INTERNAL MEDICINE

## 2019-01-01 PROCEDURE — 97535 SELF CARE MNGMENT TRAINING: CPT

## 2019-01-01 PROCEDURE — 87040 BLOOD CULTURE FOR BACTERIA: CPT

## 2019-01-01 PROCEDURE — 80053 COMPREHEN METABOLIC PANEL: CPT

## 2019-01-01 PROCEDURE — 87086 URINE CULTURE/COLONY COUNT: CPT

## 2019-01-01 PROCEDURE — G8428 CUR MEDS NOT DOCUMENT: HCPCS | Performed by: PHYSICIAN ASSISTANT

## 2019-01-01 PROCEDURE — 6370000000 HC RX 637 (ALT 250 FOR IP): Performed by: PHYSICIAN ASSISTANT

## 2019-01-01 PROCEDURE — 97542 WHEELCHAIR MNGMENT TRAINING: CPT

## 2019-01-01 PROCEDURE — 2580000003 HC RX 258

## 2019-01-01 PROCEDURE — 1200000003 HC TELEMETRY R&B

## 2019-01-01 PROCEDURE — 97127 HC SP THER IVNTJ W/FOCUS COG FUNCJ: CPT

## 2019-01-01 PROCEDURE — 87186 SC STD MICRODIL/AGAR DIL: CPT

## 2019-01-01 PROCEDURE — 94760 N-INVAS EAR/PLS OXIMETRY 1: CPT

## 2019-01-01 PROCEDURE — 99232 SBSQ HOSP IP/OBS MODERATE 35: CPT | Performed by: NURSE PRACTITIONER

## 2019-01-01 PROCEDURE — 83735 ASSAY OF MAGNESIUM: CPT

## 2019-01-01 PROCEDURE — 81001 URINALYSIS AUTO W/SCOPE: CPT

## 2019-01-01 PROCEDURE — 87804 INFLUENZA ASSAY W/OPTIC: CPT

## 2019-01-01 PROCEDURE — 93000 ELECTROCARDIOGRAM COMPLETE: CPT | Performed by: PHYSICIAN ASSISTANT

## 2019-01-01 PROCEDURE — 97166 OT EVAL MOD COMPLEX 45 MIN: CPT

## 2019-01-01 PROCEDURE — 85025 COMPLETE CBC W/AUTO DIFF WBC: CPT

## 2019-01-01 PROCEDURE — 92610 EVALUATE SWALLOWING FUNCTION: CPT

## 2019-01-01 PROCEDURE — 2580000003 HC RX 258: Performed by: PHYSICAL MEDICINE & REHABILITATION

## 2019-01-01 PROCEDURE — 99284 EMERGENCY DEPT VISIT MOD MDM: CPT

## 2019-01-01 PROCEDURE — 92523 SPEECH SOUND LANG COMPREHEN: CPT

## 2019-01-01 PROCEDURE — 74018 RADEX ABDOMEN 1 VIEW: CPT

## 2019-01-01 PROCEDURE — 51702 INSERT TEMP BLADDER CATH: CPT

## 2019-01-01 PROCEDURE — 99233 SBSQ HOSP IP/OBS HIGH 50: CPT | Performed by: NURSE PRACTITIONER

## 2019-01-01 PROCEDURE — 6360000002 HC RX W HCPCS

## 2019-01-01 PROCEDURE — 92507 TX SP LANG VOICE COMM INDIV: CPT | Performed by: SPEECH-LANGUAGE PATHOLOGIST

## 2019-01-01 PROCEDURE — 0107U C DIFF TOX AG DETCJ IA STOOL: CPT

## 2019-01-01 PROCEDURE — 99213 OFFICE O/P EST LOW 20 MIN: CPT | Performed by: PHYSICIAN ASSISTANT

## 2019-01-01 PROCEDURE — 87077 CULTURE AEROBIC IDENTIFY: CPT

## 2019-01-01 PROCEDURE — 97163 PT EVAL HIGH COMPLEX 45 MIN: CPT

## 2019-01-01 PROCEDURE — 87500 VANOMYCIN DNA AMP PROBE: CPT

## 2019-01-01 PROCEDURE — 87081 CULTURE SCREEN ONLY: CPT

## 2019-01-01 PROCEDURE — 82306 VITAMIN D 25 HYDROXY: CPT

## 2019-01-01 PROCEDURE — 2580000003 HC RX 258: Performed by: NURSE PRACTITIONER

## 2019-01-01 PROCEDURE — G8484 FLU IMMUNIZE NO ADMIN: HCPCS | Performed by: PHYSICIAN ASSISTANT

## 2019-01-01 PROCEDURE — 82436 ASSAY OF URINE CHLORIDE: CPT

## 2019-01-01 PROCEDURE — 6370000000 HC RX 637 (ALT 250 FOR IP): Performed by: PHARMACIST

## 2019-01-01 PROCEDURE — 87147 CULTURE TYPE IMMUNOLOGIC: CPT

## 2019-01-01 PROCEDURE — 99223 1ST HOSP IP/OBS HIGH 75: CPT | Performed by: INTERNAL MEDICINE

## 2019-01-01 PROCEDURE — 97112 NEUROMUSCULAR REEDUCATION: CPT

## 2019-01-01 PROCEDURE — 87641 MR-STAPH DNA AMP PROBE: CPT

## 2019-01-01 PROCEDURE — 2580000003 HC RX 258: Performed by: EMERGENCY MEDICINE

## 2019-01-01 PROCEDURE — 49465 FLUORO EXAM OF G/COLON TUBE: CPT

## 2019-01-01 PROCEDURE — 97167 OT EVAL HIGH COMPLEX 60 MIN: CPT

## 2019-01-01 PROCEDURE — 1123F ACP DISCUSS/DSCN MKR DOCD: CPT | Performed by: PHYSICIAN ASSISTANT

## 2019-01-01 PROCEDURE — 2500000003 HC RX 250 WO HCPCS: Performed by: PHYSICAL MEDICINE & REHABILITATION

## 2019-01-01 PROCEDURE — 76705 ECHO EXAM OF ABDOMEN: CPT

## 2019-01-01 PROCEDURE — 99239 HOSP IP/OBS DSCHRG MGMT >30: CPT | Performed by: NURSE PRACTITIONER

## 2019-01-01 PROCEDURE — 97127 HC OT THER IVNTJ W/FOCUS COG FUNCJ: CPT

## 2019-01-01 PROCEDURE — 6360000002 HC RX W HCPCS: Performed by: EMERGENCY MEDICINE

## 2019-01-01 PROCEDURE — 83605 ASSAY OF LACTIC ACID: CPT

## 2019-01-01 PROCEDURE — 99232 SBSQ HOSP IP/OBS MODERATE 35: CPT | Performed by: INTERNAL MEDICINE

## 2019-01-01 PROCEDURE — 1036F TOBACCO NON-USER: CPT | Performed by: PHYSICIAN ASSISTANT

## 2019-01-01 PROCEDURE — 99221 1ST HOSP IP/OBS SF/LOW 40: CPT | Performed by: NURSE PRACTITIONER

## 2019-01-01 PROCEDURE — 82948 REAGENT STRIP/BLOOD GLUCOSE: CPT

## 2019-01-01 PROCEDURE — 2709999900 HC NON-CHARGEABLE SUPPLY: Performed by: INTERNAL MEDICINE

## 2019-01-01 PROCEDURE — 84300 ASSAY OF URINE SODIUM: CPT

## 2019-01-01 PROCEDURE — 0296T HC EXT ECG RECORDING 2-21 DAY HOOKUP: CPT

## 2019-01-01 PROCEDURE — 84133 ASSAY OF URINE POTASSIUM: CPT

## 2019-01-01 PROCEDURE — 4040F PNEUMOC VAC/ADMIN/RCVD: CPT | Performed by: PHYSICIAN ASSISTANT

## 2019-01-01 PROCEDURE — 92611 MOTION FLUOROSCOPY/SWALLOW: CPT

## 2019-01-01 PROCEDURE — 51798 US URINE CAPACITY MEASURE: CPT

## 2019-01-01 PROCEDURE — G8420 CALC BMI NORM PARAMETERS: HCPCS | Performed by: PHYSICIAN ASSISTANT

## 2019-01-01 PROCEDURE — 93306 TTE W/DOPPLER COMPLETE: CPT

## 2019-01-01 PROCEDURE — 90791 PSYCH DIAGNOSTIC EVALUATION: CPT | Performed by: PSYCHOLOGIST

## 2019-01-01 PROCEDURE — 96365 THER/PROPH/DIAG IV INF INIT: CPT

## 2019-01-01 PROCEDURE — 1200000000 HC SEMI PRIVATE

## 2019-01-01 RX ORDER — ONDANSETRON 2 MG/ML
4 INJECTION INTRAMUSCULAR; INTRAVENOUS EVERY 6 HOURS PRN
Status: DISCONTINUED | OUTPATIENT
Start: 2019-01-01 | End: 2019-01-01 | Stop reason: HOSPADM

## 2019-01-01 RX ORDER — LOSARTAN POTASSIUM 50 MG/1
50 TABLET ORAL DAILY
Qty: 30 TABLET | Refills: 3 | Status: ON HOLD | DISCHARGE
Start: 2019-01-01 | End: 2019-01-01 | Stop reason: HOSPADM

## 2019-01-01 RX ORDER — SULFAMETHOXAZOLE AND TRIMETHOPRIM 800; 160 MG/1; MG/1
1 TABLET ORAL EVERY 12 HOURS SCHEDULED
Status: COMPLETED | OUTPATIENT
Start: 2019-01-01 | End: 2019-01-01

## 2019-01-01 RX ORDER — LANOLIN ALCOHOL/MO/W.PET/CERES
100 CREAM (GRAM) TOPICAL DAILY
Qty: 30 TABLET | Refills: 3 | DISCHARGE
Start: 2019-01-01

## 2019-01-01 RX ORDER — METHYLPHENIDATE HYDROCHLORIDE 10 MG/1
10 TABLET ORAL 2 TIMES DAILY
Qty: 60 TABLET | Refills: 0 | Status: ON HOLD | DISCHARGE
Start: 2019-01-01 | End: 2019-01-01 | Stop reason: SDUPTHER

## 2019-01-01 RX ORDER — THIAMINE MONONITRATE (VIT B1) 100 MG
100 TABLET ORAL DAILY
Status: DISCONTINUED | OUTPATIENT
Start: 2019-01-01 | End: 2019-01-01 | Stop reason: HOSPADM

## 2019-01-01 RX ORDER — BROMOCRIPTINE MESYLATE 2.5 MG/1
5 TABLET ORAL
Status: DISCONTINUED | OUTPATIENT
Start: 2019-01-01 | End: 2019-01-01

## 2019-01-01 RX ORDER — SODIUM CHLORIDE 9 MG/ML
INJECTION, SOLUTION INTRAVENOUS CONTINUOUS
Status: DISCONTINUED | OUTPATIENT
Start: 2019-01-01 | End: 2019-01-01

## 2019-01-01 RX ORDER — LINEZOLID 2 MG/ML
600 INJECTION, SOLUTION INTRAVENOUS EVERY 12 HOURS
Status: DISCONTINUED | OUTPATIENT
Start: 2019-01-01 | End: 2019-01-01

## 2019-01-01 RX ORDER — HEPARIN SODIUM 5000 [USP'U]/ML
5000 INJECTION, SOLUTION INTRAVENOUS; SUBCUTANEOUS 2 TIMES DAILY
Status: DISCONTINUED | OUTPATIENT
Start: 2019-01-01 | End: 2019-01-01 | Stop reason: HOSPADM

## 2019-01-01 RX ORDER — BUSPIRONE HYDROCHLORIDE 5 MG/1
5 TABLET ORAL 2 TIMES DAILY
DISCHARGE
Start: 2019-01-01

## 2019-01-01 RX ORDER — METHYLPHENIDATE HYDROCHLORIDE 5 MG/1
5 TABLET ORAL
Status: DISCONTINUED | OUTPATIENT
Start: 2019-01-01 | End: 2019-01-01

## 2019-01-01 RX ORDER — LOSARTAN POTASSIUM 50 MG/1
50 TABLET ORAL DAILY
Status: DISCONTINUED | OUTPATIENT
Start: 2019-01-01 | End: 2019-01-01 | Stop reason: HOSPADM

## 2019-01-01 RX ORDER — SENNA PLUS 8.6 MG/1
2 TABLET ORAL NIGHTLY PRN
DISCHARGE
Start: 2019-01-01 | End: 2019-01-01

## 2019-01-01 RX ORDER — LANOLIN ALCOHOL/MO/W.PET/CERES
3 CREAM (GRAM) TOPICAL DAILY
Status: DISCONTINUED | OUTPATIENT
Start: 2019-01-01 | End: 2019-01-01

## 2019-01-01 RX ORDER — METHYLPHENIDATE HYDROCHLORIDE 10 MG/1
10 TABLET ORAL 2 TIMES DAILY
Status: DISCONTINUED | OUTPATIENT
Start: 2019-01-01 | End: 2019-01-01 | Stop reason: HOSPADM

## 2019-01-01 RX ORDER — SODIUM CHLORIDE 450 MG/100ML
INJECTION, SOLUTION INTRAVENOUS CONTINUOUS
Status: DISCONTINUED | OUTPATIENT
Start: 2019-01-01 | End: 2019-01-01

## 2019-01-01 RX ORDER — BUSPIRONE HYDROCHLORIDE 5 MG/1
5 TABLET ORAL ONCE
Status: COMPLETED | OUTPATIENT
Start: 2019-01-01 | End: 2019-01-01

## 2019-01-01 RX ORDER — DOXAZOSIN MESYLATE 4 MG/1
2 TABLET ORAL DAILY
Status: DISCONTINUED | OUTPATIENT
Start: 2019-01-01 | End: 2019-01-01 | Stop reason: HOSPADM

## 2019-01-01 RX ORDER — ACETAMINOPHEN 160 MG/5ML
1000 SOLUTION ORAL EVERY 8 HOURS PRN
Status: DISCONTINUED | OUTPATIENT
Start: 2019-01-01 | End: 2019-01-01 | Stop reason: HOSPADM

## 2019-01-01 RX ORDER — DOCUSATE SODIUM 100 MG/1
100 CAPSULE, LIQUID FILLED ORAL 2 TIMES DAILY PRN
Status: DISCONTINUED | OUTPATIENT
Start: 2019-01-01 | End: 2019-01-01

## 2019-01-01 RX ORDER — IPRATROPIUM BROMIDE AND ALBUTEROL SULFATE 2.5; .5 MG/3ML; MG/3ML
3 SOLUTION RESPIRATORY (INHALATION) EVERY 4 HOURS PRN
Qty: 360 ML | DISCHARGE
Start: 2019-01-01

## 2019-01-01 RX ORDER — METHYLPHENIDATE HYDROCHLORIDE 5 MG/1
10 TABLET ORAL
Status: DISCONTINUED | OUTPATIENT
Start: 2019-01-01 | End: 2019-01-01 | Stop reason: HOSPADM

## 2019-01-01 RX ORDER — LOPERAMIDE HCL 1 MG/7.5ML
2 SUSPENSION ORAL 4 TIMES DAILY PRN
Status: DISCONTINUED | OUTPATIENT
Start: 2019-01-01 | End: 2019-01-01 | Stop reason: HOSPADM

## 2019-01-01 RX ORDER — BISACODYL 10 MG
10 SUPPOSITORY, RECTAL RECTAL DAILY PRN
Status: DISCONTINUED | OUTPATIENT
Start: 2019-01-01 | End: 2019-01-01 | Stop reason: HOSPADM

## 2019-01-01 RX ORDER — FOLIC ACID 1 MG/1
1 TABLET ORAL DAILY
Status: DISCONTINUED | OUTPATIENT
Start: 2019-01-01 | End: 2019-01-01 | Stop reason: HOSPADM

## 2019-01-01 RX ORDER — QUETIAPINE FUMARATE 25 MG/1
25 TABLET, FILM COATED ORAL EVERY 6 HOURS PRN
Status: DISCONTINUED | OUTPATIENT
Start: 2019-01-01 | End: 2019-01-01

## 2019-01-01 RX ORDER — ZIPRASIDONE MESYLATE 20 MG/ML
10 INJECTION, POWDER, LYOPHILIZED, FOR SOLUTION INTRAMUSCULAR EVERY 6 HOURS PRN
Status: DISCONTINUED | OUTPATIENT
Start: 2019-01-01 | End: 2019-01-01

## 2019-01-01 RX ORDER — SODIUM CHLORIDE 0.9 % (FLUSH) 0.9 %
10 SYRINGE (ML) INJECTION EVERY 12 HOURS SCHEDULED
Status: DISCONTINUED | OUTPATIENT
Start: 2019-01-01 | End: 2019-01-01 | Stop reason: HOSPADM

## 2019-01-01 RX ORDER — QUETIAPINE FUMARATE 25 MG/1
25 TABLET, FILM COATED ORAL EVERY 6 HOURS PRN
Qty: 60 TABLET | Refills: 3 | Status: CANCELLED | DISCHARGE
Start: 2019-01-01

## 2019-01-01 RX ORDER — QUETIAPINE FUMARATE 25 MG/1
25 TABLET, FILM COATED ORAL NIGHTLY
Status: DISCONTINUED | OUTPATIENT
Start: 2019-01-01 | End: 2019-01-01

## 2019-01-01 RX ORDER — SENNA PLUS 8.6 MG/1
2 TABLET ORAL NIGHTLY PRN
Status: DISCONTINUED | OUTPATIENT
Start: 2019-01-01 | End: 2019-01-01 | Stop reason: HOSPADM

## 2019-01-01 RX ORDER — ACETAMINOPHEN 650 MG/1
650 SUPPOSITORY RECTAL EVERY 4 HOURS PRN
Status: DISCONTINUED | OUTPATIENT
Start: 2019-01-01 | End: 2019-01-01 | Stop reason: HOSPADM

## 2019-01-01 RX ORDER — QUETIAPINE FUMARATE 25 MG/1
50 TABLET, FILM COATED ORAL NIGHTLY
Status: DISCONTINUED | OUTPATIENT
Start: 2019-01-01 | End: 2019-01-01

## 2019-01-01 RX ORDER — FLUCONAZOLE 100 MG/1
100 TABLET ORAL DAILY
Status: COMPLETED | OUTPATIENT
Start: 2019-01-01 | End: 2019-01-01

## 2019-01-01 RX ORDER — DOXAZOSIN 2 MG/1
2 TABLET ORAL DAILY
Qty: 30 TABLET | Refills: 3 | Status: SHIPPED | OUTPATIENT
Start: 2019-01-01

## 2019-01-01 RX ORDER — LOSARTAN POTASSIUM 50 MG/1
50 TABLET ORAL DAILY
Status: DISCONTINUED | OUTPATIENT
Start: 2019-01-01 | End: 2019-01-01

## 2019-01-01 RX ORDER — TAMSULOSIN HYDROCHLORIDE 0.4 MG/1
0.4 CAPSULE ORAL NIGHTLY
Status: COMPLETED | OUTPATIENT
Start: 2019-01-01 | End: 2019-01-01

## 2019-01-01 RX ORDER — BISACODYL 10 MG
10 SUPPOSITORY, RECTAL RECTAL DAILY PRN
DISCHARGE
Start: 2019-01-01 | End: 2019-01-01

## 2019-01-01 RX ORDER — FAMOTIDINE 20 MG/1
20 TABLET, FILM COATED ORAL 2 TIMES DAILY
Status: DISCONTINUED | OUTPATIENT
Start: 2019-01-01 | End: 2019-01-01 | Stop reason: HOSPADM

## 2019-01-01 RX ORDER — DEXTROSE MONOHYDRATE 50 MG/ML
INJECTION, SOLUTION INTRAVENOUS CONTINUOUS
Status: DISCONTINUED | OUTPATIENT
Start: 2019-01-01 | End: 2019-01-01 | Stop reason: HOSPADM

## 2019-01-01 RX ORDER — METHYLPHENIDATE HYDROCHLORIDE 10 MG/1
10 TABLET ORAL SEE ADMIN INSTRUCTIONS
Qty: 20 TABLET | Refills: 0 | Status: SHIPPED | OUTPATIENT
Start: 2019-01-01 | End: 2019-01-01

## 2019-01-01 RX ORDER — TAMSULOSIN HYDROCHLORIDE 0.4 MG/1
0.4 CAPSULE ORAL DAILY
Status: DISCONTINUED | OUTPATIENT
Start: 2019-01-01 | End: 2019-01-01

## 2019-01-01 RX ORDER — QUETIAPINE FUMARATE 25 MG/1
37.5 TABLET, FILM COATED ORAL EVERY 6 HOURS PRN
Qty: 60 TABLET | Refills: 3 | DISCHARGE
Start: 2019-01-01 | End: 2020-01-01

## 2019-01-01 RX ORDER — QUETIAPINE FUMARATE 25 MG/1
37.5 TABLET, FILM COATED ORAL EVERY 6 HOURS PRN
Status: DISCONTINUED | OUTPATIENT
Start: 2019-01-01 | End: 2019-01-01 | Stop reason: HOSPADM

## 2019-01-01 RX ORDER — BUSPIRONE HYDROCHLORIDE 5 MG/1
5 TABLET ORAL 2 TIMES DAILY
Status: DISCONTINUED | OUTPATIENT
Start: 2019-01-01 | End: 2019-01-01 | Stop reason: HOSPADM

## 2019-01-01 RX ORDER — SODIUM CHLORIDE 0.9 % (FLUSH) 0.9 %
10 SYRINGE (ML) INJECTION PRN
Status: DISCONTINUED | OUTPATIENT
Start: 2019-01-01 | End: 2019-01-01 | Stop reason: HOSPADM

## 2019-01-01 RX ORDER — CEFTRIAXONE 1 G/1
1 INJECTION, POWDER, FOR SOLUTION INTRAMUSCULAR; INTRAVENOUS DAILY
Status: DISCONTINUED | OUTPATIENT
Start: 2019-01-01 | End: 2019-01-01 | Stop reason: SDUPTHER

## 2019-01-01 RX ORDER — TRAZODONE HYDROCHLORIDE 100 MG/1
100 TABLET ORAL NIGHTLY
Status: DISCONTINUED | OUTPATIENT
Start: 2019-01-01 | End: 2019-01-01 | Stop reason: HOSPADM

## 2019-01-01 RX ORDER — METHYLPHENIDATE HYDROCHLORIDE 10 MG/1
10 TABLET ORAL
Refills: 0 | Status: CANCELLED | DISCHARGE
Start: 2019-01-01 | End: 2019-01-01

## 2019-01-01 RX ORDER — IPRATROPIUM BROMIDE AND ALBUTEROL SULFATE 2.5; .5 MG/3ML; MG/3ML
3 SOLUTION RESPIRATORY (INHALATION) EVERY 4 HOURS PRN
Status: DISCONTINUED | OUTPATIENT
Start: 2019-01-01 | End: 2019-01-01 | Stop reason: HOSPADM

## 2019-01-01 RX ORDER — LOPERAMIDE HCL 1 MG/7.5ML
2 SUSPENSION ORAL 4 TIMES DAILY PRN
Qty: 300 ML | DISCHARGE
Start: 2019-01-01

## 2019-01-01 RX ORDER — DOXAZOSIN MESYLATE 4 MG/1
4 TABLET ORAL DAILY
Status: DISCONTINUED | OUTPATIENT
Start: 2019-01-01 | End: 2019-01-01

## 2019-01-01 RX ORDER — TRAZODONE HYDROCHLORIDE 100 MG/1
100 TABLET ORAL NIGHTLY
DISCHARGE
Start: 2019-01-01

## 2019-01-01 RX ORDER — SODIUM CHLORIDE AND POTASSIUM CHLORIDE .9; .15 G/100ML; G/100ML
SOLUTION INTRAVENOUS CONTINUOUS
Status: DISPENSED | OUTPATIENT
Start: 2019-01-01 | End: 2019-01-01

## 2019-01-01 RX ORDER — IPRATROPIUM BROMIDE AND ALBUTEROL SULFATE 2.5; .5 MG/3ML; MG/3ML
1 SOLUTION RESPIRATORY (INHALATION) EVERY 4 HOURS PRN
Status: DISCONTINUED | OUTPATIENT
Start: 2019-01-01 | End: 2019-01-01 | Stop reason: HOSPADM

## 2019-01-01 RX ORDER — LACTULOSE 10 G/15ML
20 SOLUTION ORAL 2 TIMES DAILY
Status: COMPLETED | OUTPATIENT
Start: 2019-01-01 | End: 2019-01-01

## 2019-01-01 RX ORDER — FAMOTIDINE 20 MG/1
20 TABLET, FILM COATED ORAL 2 TIMES DAILY
Qty: 60 TABLET | Refills: 3 | DISCHARGE
Start: 2019-01-01

## 2019-01-01 RX ORDER — HYDROCODONE BITARTRATE AND ACETAMINOPHEN 5; 325 MG/1; MG/1
1 TABLET ORAL EVERY 6 HOURS PRN
Status: ACTIVE | OUTPATIENT
Start: 2019-01-01 | End: 2019-01-01

## 2019-01-01 RX ORDER — FAMOTIDINE 20 MG/1
20 TABLET, FILM COATED ORAL DAILY
Status: DISCONTINUED | OUTPATIENT
Start: 2019-01-01 | End: 2019-01-01

## 2019-01-01 RX ORDER — TAMSULOSIN HYDROCHLORIDE 0.4 MG/1
0.8 CAPSULE ORAL NIGHTLY
Status: DISCONTINUED | OUTPATIENT
Start: 2019-01-01 | End: 2019-01-01

## 2019-01-01 RX ORDER — FOLIC ACID 1 MG/1
1 TABLET ORAL DAILY
Qty: 30 TABLET | Refills: 3 | DISCHARGE
Start: 2019-01-01

## 2019-01-01 RX ORDER — LOPERAMIDE HCL 1 MG/7.5ML
2 SUSPENSION ORAL 4 TIMES DAILY PRN
Status: DISCONTINUED | OUTPATIENT
Start: 2019-01-01 | End: 2019-01-01 | Stop reason: SDUPTHER

## 2019-01-01 RX ORDER — BROMOCRIPTINE MESYLATE 2.5 MG/1
5 TABLET ORAL 2 TIMES DAILY
Status: DISCONTINUED | OUTPATIENT
Start: 2019-01-01 | End: 2019-01-01

## 2019-01-01 RX ADMIN — BROMOCRIPTINE MESYLATE 5 MG: 2.5 TABLET ORAL at 12:49

## 2019-01-01 RX ADMIN — Medication 100 MG: at 09:34

## 2019-01-01 RX ADMIN — LACTULOSE 20 G: 20 SOLUTION ORAL at 12:28

## 2019-01-01 RX ADMIN — FAMOTIDINE 20 MG: 20 TABLET ORAL at 20:20

## 2019-01-01 RX ADMIN — ACETAMINOPHEN 650 MG: 650 SUPPOSITORY RECTAL at 00:02

## 2019-01-01 RX ADMIN — HEPARIN SODIUM 5000 UNITS: 5000 INJECTION INTRAVENOUS; SUBCUTANEOUS at 11:20

## 2019-01-01 RX ADMIN — TRAZODONE HYDROCHLORIDE 100 MG: 100 TABLET ORAL at 19:58

## 2019-01-01 RX ADMIN — MAGNESIUM HYDROXIDE 30 ML: 400 SUSPENSION ORAL at 14:16

## 2019-01-01 RX ADMIN — FAMOTIDINE 20 MG: 20 TABLET ORAL at 20:26

## 2019-01-01 RX ADMIN — METHYLPHENIDATE HYDROCHLORIDE 10 MG: 5 TABLET ORAL at 05:05

## 2019-01-01 RX ADMIN — SODIUM CHLORIDE AND POTASSIUM CHLORIDE: .9; .15 SOLUTION INTRAVENOUS at 03:40

## 2019-01-01 RX ADMIN — FAMOTIDINE 20 MG: 20 TABLET ORAL at 21:09

## 2019-01-01 RX ADMIN — SULFAMETHOXAZOLE AND TRIMETHOPRIM 1 TABLET: 800; 160 TABLET ORAL at 08:51

## 2019-01-01 RX ADMIN — Medication 100 MG: at 08:36

## 2019-01-01 RX ADMIN — FAMOTIDINE 20 MG: 20 TABLET, FILM COATED ORAL at 20:14

## 2019-01-01 RX ADMIN — QUETIAPINE FUMARATE 37.5 MG: 25 TABLET ORAL at 18:00

## 2019-01-01 RX ADMIN — PSYLLIUM HUSK 1 PACKET: 3.4 GRANULE ORAL at 08:36

## 2019-01-01 RX ADMIN — TAMSULOSIN HYDROCHLORIDE 0.4 MG: 0.4 CAPSULE ORAL at 20:00

## 2019-01-01 RX ADMIN — QUETIAPINE FUMARATE 25 MG: 25 TABLET ORAL at 15:36

## 2019-01-01 RX ADMIN — METHYLPHENIDATE HYDROCHLORIDE 10 MG: 5 TABLET ORAL at 06:09

## 2019-01-01 RX ADMIN — Medication 100 MG: at 07:57

## 2019-01-01 RX ADMIN — Medication 1 MG: at 15:09

## 2019-01-01 RX ADMIN — QUETIAPINE FUMARATE 25 MG: 25 TABLET ORAL at 11:26

## 2019-01-01 RX ADMIN — NYSTATIN 500000 UNITS: 100000 SUSPENSION ORAL at 21:10

## 2019-01-01 RX ADMIN — Medication 100 MG: at 07:59

## 2019-01-01 RX ADMIN — FOLIC ACID 1 MG: 1 TABLET ORAL at 07:59

## 2019-01-01 RX ADMIN — LOSARTAN POTASSIUM 50 MG: 50 TABLET, FILM COATED ORAL at 10:02

## 2019-01-01 RX ADMIN — PSYLLIUM HUSK 1 PACKET: 3.4 GRANULE ORAL at 08:00

## 2019-01-01 RX ADMIN — Medication 100 MG: at 08:06

## 2019-01-01 RX ADMIN — PSYLLIUM HUSK 1 PACKET: 3.4 GRANULE ORAL at 09:15

## 2019-01-01 RX ADMIN — METHYLPHENIDATE HYDROCHLORIDE 10 MG: 5 TABLET ORAL at 05:46

## 2019-01-01 RX ADMIN — PIPERACILLIN AND TAZOBACTAM 3.38 G: 3; .375 INJECTION, POWDER, LYOPHILIZED, FOR SOLUTION INTRAVENOUS at 03:55

## 2019-01-01 RX ADMIN — NYSTATIN 500000 UNITS: 100000 SUSPENSION ORAL at 21:26

## 2019-01-01 RX ADMIN — PIPERACILLIN AND TAZOBACTAM 3.38 G: 3; .375 INJECTION, POWDER, LYOPHILIZED, FOR SOLUTION INTRAVENOUS at 15:15

## 2019-01-01 RX ADMIN — BROMOCRIPTINE MESYLATE 5 MG: 2.5 TABLET ORAL at 11:21

## 2019-01-01 RX ADMIN — BUSPIRONE HYDROCHLORIDE 5 MG: 5 TABLET ORAL at 00:09

## 2019-01-01 RX ADMIN — ENOXAPARIN SODIUM 40 MG: 100 INJECTION SUBCUTANEOUS at 09:32

## 2019-01-01 RX ADMIN — BROMOCRIPTINE MESYLATE 5 MG: 2.5 TABLET ORAL at 10:47

## 2019-01-01 RX ADMIN — BARIUM SULFATE 20 ML: 400 PASTE ORAL at 08:27

## 2019-01-01 RX ADMIN — BROMOCRIPTINE MESYLATE 5 MG: 2.5 TABLET ORAL at 09:41

## 2019-01-01 RX ADMIN — BROMOCRIPTINE MESYLATE 5 MG: 2.5 TABLET ORAL at 05:20

## 2019-01-01 RX ADMIN — QUETIAPINE FUMARATE 25 MG: 25 TABLET ORAL at 21:40

## 2019-01-01 RX ADMIN — TAMSULOSIN HYDROCHLORIDE 0.4 MG: 0.4 CAPSULE ORAL at 19:13

## 2019-01-01 RX ADMIN — QUETIAPINE FUMARATE 25 MG: 25 TABLET ORAL at 12:23

## 2019-01-01 RX ADMIN — QUETIAPINE FUMARATE 37.5 MG: 25 TABLET ORAL at 11:13

## 2019-01-01 RX ADMIN — METHYLPHENIDATE HYDROCHLORIDE 5 MG: 5 TABLET ORAL at 08:43

## 2019-01-01 RX ADMIN — LOSARTAN POTASSIUM 50 MG: 50 TABLET, FILM COATED ORAL at 09:40

## 2019-01-01 RX ADMIN — ENOXAPARIN SODIUM 40 MG: 100 INJECTION SUBCUTANEOUS at 09:28

## 2019-01-01 RX ADMIN — SULFAMETHOXAZOLE AND TRIMETHOPRIM 1 TABLET: 800; 160 TABLET ORAL at 18:09

## 2019-01-01 RX ADMIN — METOPROLOL TARTRATE 25 MG: 25 TABLET ORAL at 21:50

## 2019-01-01 RX ADMIN — TRAZODONE HYDROCHLORIDE 100 MG: 100 TABLET ORAL at 20:26

## 2019-01-01 RX ADMIN — BUSPIRONE HYDROCHLORIDE 5 MG: 5 TABLET ORAL at 10:13

## 2019-01-01 RX ADMIN — ENOXAPARIN SODIUM 40 MG: 100 INJECTION SUBCUTANEOUS at 08:06

## 2019-01-01 RX ADMIN — METHYLPHENIDATE HYDROCHLORIDE 5 MG: 5 TABLET ORAL at 06:10

## 2019-01-01 RX ADMIN — PSYLLIUM HUSK 1 PACKET: 3.4 GRANULE ORAL at 13:47

## 2019-01-01 RX ADMIN — Medication 100 MG: at 11:13

## 2019-01-01 RX ADMIN — ZIPRASIDONE MESYLATE 10 MG: 20 INJECTION, POWDER, LYOPHILIZED, FOR SOLUTION INTRAMUSCULAR at 22:09

## 2019-01-01 RX ADMIN — FOLIC ACID 1 MG: 1 TABLET ORAL at 09:34

## 2019-01-01 RX ADMIN — QUETIAPINE FUMARATE 37.5 MG: 25 TABLET ORAL at 20:38

## 2019-01-01 RX ADMIN — TAMSULOSIN HYDROCHLORIDE 0.8 MG: 0.4 CAPSULE ORAL at 19:58

## 2019-01-01 RX ADMIN — PSYLLIUM HUSK 1 PACKET: 3.4 GRANULE ORAL at 17:43

## 2019-01-01 RX ADMIN — Medication 100 MG: at 14:41

## 2019-01-01 RX ADMIN — SULFAMETHOXAZOLE AND TRIMETHOPRIM 1 TABLET: 800; 160 TABLET ORAL at 08:24

## 2019-01-01 RX ADMIN — BUSPIRONE HYDROCHLORIDE 5 MG: 5 TABLET ORAL at 20:48

## 2019-01-01 RX ADMIN — ENOXAPARIN SODIUM 40 MG: 100 INJECTION SUBCUTANEOUS at 08:51

## 2019-01-01 RX ADMIN — FAMOTIDINE 20 MG: 20 TABLET ORAL at 08:17

## 2019-01-01 RX ADMIN — LINEZOLID 600 MG: 600 INJECTION, SOLUTION INTRAVENOUS at 23:35

## 2019-01-01 RX ADMIN — TRAZODONE HYDROCHLORIDE 100 MG: 100 TABLET ORAL at 20:35

## 2019-01-01 RX ADMIN — BUSPIRONE HYDROCHLORIDE 5 MG: 5 TABLET ORAL at 20:58

## 2019-01-01 RX ADMIN — LOSARTAN POTASSIUM 50 MG: 50 TABLET, FILM COATED ORAL at 09:13

## 2019-01-01 RX ADMIN — TRAZODONE HYDROCHLORIDE 100 MG: 100 TABLET ORAL at 20:20

## 2019-01-01 RX ADMIN — Medication 100 MG: at 08:30

## 2019-01-01 RX ADMIN — Medication 100 MG: at 08:50

## 2019-01-01 RX ADMIN — FOLIC ACID 1 MG: 1 TABLET ORAL at 09:33

## 2019-01-01 RX ADMIN — FLUCONAZOLE 100 MG: 100 TABLET ORAL at 09:27

## 2019-01-01 RX ADMIN — PSYLLIUM HUSK 1 PACKET: 3.4 GRANULE ORAL at 13:28

## 2019-01-01 RX ADMIN — FOLIC ACID 1 MG: 1 TABLET ORAL at 09:40

## 2019-01-01 RX ADMIN — PSYLLIUM HUSK 1 PACKET: 3.4 GRANULE ORAL at 13:16

## 2019-01-01 RX ADMIN — METHYLPHENIDATE HYDROCHLORIDE 10 MG: 5 TABLET ORAL at 06:42

## 2019-01-01 RX ADMIN — QUETIAPINE FUMARATE 25 MG: 25 TABLET ORAL at 19:54

## 2019-01-01 RX ADMIN — ENOXAPARIN SODIUM 40 MG: 100 INJECTION SUBCUTANEOUS at 08:58

## 2019-01-01 RX ADMIN — ZIPRASIDONE MESYLATE 10 MG: 20 INJECTION, POWDER, LYOPHILIZED, FOR SOLUTION INTRAMUSCULAR at 10:25

## 2019-01-01 RX ADMIN — PIPERACILLIN AND TAZOBACTAM 3.38 G: 3; .375 INJECTION, POWDER, LYOPHILIZED, FOR SOLUTION INTRAVENOUS at 02:26

## 2019-01-01 RX ADMIN — HEPARIN SODIUM 5000 UNITS: 5000 INJECTION INTRAVENOUS; SUBCUTANEOUS at 20:48

## 2019-01-01 RX ADMIN — PSYLLIUM HUSK 1 PACKET: 3.4 GRANULE ORAL at 13:09

## 2019-01-01 RX ADMIN — PIPERACILLIN AND TAZOBACTAM 3.38 G: 3; .375 INJECTION, POWDER, LYOPHILIZED, FOR SOLUTION INTRAVENOUS at 23:33

## 2019-01-01 RX ADMIN — BROMOCRIPTINE MESYLATE 5 MG: 2.5 TABLET ORAL at 06:21

## 2019-01-01 RX ADMIN — HEPARIN SODIUM 5000 UNITS: 5000 INJECTION INTRAVENOUS; SUBCUTANEOUS at 08:01

## 2019-01-01 RX ADMIN — BROMOCRIPTINE MESYLATE 5 MG: 2.5 TABLET ORAL at 11:45

## 2019-01-01 RX ADMIN — BUSPIRONE HYDROCHLORIDE 5 MG: 5 TABLET ORAL at 20:26

## 2019-01-01 RX ADMIN — QUETIAPINE FUMARATE 25 MG: 25 TABLET ORAL at 13:30

## 2019-01-01 RX ADMIN — SODIUM CHLORIDE AND POTASSIUM CHLORIDE: .9; .15 SOLUTION INTRAVENOUS at 16:53

## 2019-01-01 RX ADMIN — Medication 100 MG: at 08:44

## 2019-01-01 RX ADMIN — METHYLPHENIDATE HYDROCHLORIDE 10 MG: 10 TABLET ORAL at 13:57

## 2019-01-01 RX ADMIN — NYSTATIN 500000 UNITS: 100000 SUSPENSION ORAL at 08:43

## 2019-01-01 RX ADMIN — FAMOTIDINE 20 MG: 20 TABLET ORAL at 08:06

## 2019-01-01 RX ADMIN — LOSARTAN POTASSIUM 50 MG: 50 TABLET, FILM COATED ORAL at 09:09

## 2019-01-01 RX ADMIN — LOSARTAN POTASSIUM 50 MG: 50 TABLET, FILM COATED ORAL at 09:15

## 2019-01-01 RX ADMIN — BROMOCRIPTINE MESYLATE 5 MG: 2.5 TABLET ORAL at 05:05

## 2019-01-01 RX ADMIN — BUSPIRONE HYDROCHLORIDE 5 MG: 5 TABLET ORAL at 10:02

## 2019-01-01 RX ADMIN — Medication 100 MG: at 10:05

## 2019-01-01 RX ADMIN — FAMOTIDINE 20 MG: 20 TABLET ORAL at 10:02

## 2019-01-01 RX ADMIN — Medication 100 MG: at 09:23

## 2019-01-01 RX ADMIN — DIATRIZOATE MEGLUMINE AND DIATRIZOATE SODIUM 30 ML: 600; 100 SOLUTION ORAL; RECTAL at 19:05

## 2019-01-01 RX ADMIN — NYSTATIN 500000 UNITS: 100000 SUSPENSION ORAL at 09:23

## 2019-01-01 RX ADMIN — ZIPRASIDONE MESYLATE 10 MG: 20 INJECTION, POWDER, LYOPHILIZED, FOR SOLUTION INTRAMUSCULAR at 19:23

## 2019-01-01 RX ADMIN — FLUCONAZOLE 100 MG: 100 TABLET ORAL at 15:10

## 2019-01-01 RX ADMIN — QUETIAPINE FUMARATE 25 MG: 25 TABLET ORAL at 21:09

## 2019-01-01 RX ADMIN — HEPARIN SODIUM 5000 UNITS: 5000 INJECTION INTRAVENOUS; SUBCUTANEOUS at 21:51

## 2019-01-01 RX ADMIN — PIPERACILLIN AND TAZOBACTAM 3.38 G: 3; .375 INJECTION, POWDER, LYOPHILIZED, FOR SOLUTION INTRAVENOUS at 15:18

## 2019-01-01 RX ADMIN — FAMOTIDINE 20 MG: 20 TABLET ORAL at 08:54

## 2019-01-01 RX ADMIN — BISACODYL 10 MG: 10 SUPPOSITORY RECTAL at 18:41

## 2019-01-01 RX ADMIN — METHYLPHENIDATE HYDROCHLORIDE 10 MG: 5 TABLET ORAL at 12:19

## 2019-01-01 RX ADMIN — WATER 1.2 ML: 1 INJECTION INTRAMUSCULAR; INTRAVENOUS; SUBCUTANEOUS at 15:51

## 2019-01-01 RX ADMIN — FAMOTIDINE 20 MG: 20 TABLET, FILM COATED ORAL at 21:50

## 2019-01-01 RX ADMIN — TRAZODONE HYDROCHLORIDE 100 MG: 100 TABLET ORAL at 21:40

## 2019-01-01 RX ADMIN — QUETIAPINE FUMARATE 50 MG: 25 TABLET ORAL at 20:00

## 2019-01-01 RX ADMIN — SULFAMETHOXAZOLE AND TRIMETHOPRIM 1 TABLET: 800; 160 TABLET ORAL at 20:46

## 2019-01-01 RX ADMIN — PSYLLIUM HUSK 1 PACKET: 3.4 GRANULE ORAL at 08:34

## 2019-01-01 RX ADMIN — FAMOTIDINE 20 MG: 20 TABLET ORAL at 09:28

## 2019-01-01 RX ADMIN — HEPARIN SODIUM 5000 UNITS: 5000 INJECTION INTRAVENOUS; SUBCUTANEOUS at 08:37

## 2019-01-01 RX ADMIN — Medication 100 MG: at 10:41

## 2019-01-01 RX ADMIN — FAMOTIDINE 20 MG: 20 TABLET ORAL at 21:25

## 2019-01-01 RX ADMIN — METHYLPHENIDATE HYDROCHLORIDE 10 MG: 5 TABLET ORAL at 11:18

## 2019-01-01 RX ADMIN — ACETAMINOPHEN ORAL SOLUTION 1000 MG: 650 SOLUTION ORAL at 09:37

## 2019-01-01 RX ADMIN — TRAZODONE HYDROCHLORIDE 100 MG: 100 TABLET ORAL at 20:46

## 2019-01-01 RX ADMIN — TRAZODONE HYDROCHLORIDE 100 MG: 100 TABLET ORAL at 20:30

## 2019-01-01 RX ADMIN — FAMOTIDINE 20 MG: 20 TABLET ORAL at 21:01

## 2019-01-01 RX ADMIN — PSYLLIUM HUSK 1 PACKET: 3.4 GRANULE ORAL at 14:58

## 2019-01-01 RX ADMIN — BROMOCRIPTINE MESYLATE 5 MG: 2.5 TABLET ORAL at 06:32

## 2019-01-01 RX ADMIN — Medication 100 MG: at 08:33

## 2019-01-01 RX ADMIN — PSYLLIUM HUSK 1 PACKET: 3.4 GRANULE ORAL at 11:13

## 2019-01-01 RX ADMIN — HEPARIN SODIUM 5000 UNITS: 5000 INJECTION INTRAVENOUS; SUBCUTANEOUS at 10:42

## 2019-01-01 RX ADMIN — LINEZOLID 600 MG: 600 INJECTION, SOLUTION INTRAVENOUS at 11:30

## 2019-01-01 RX ADMIN — Medication 100 MG: at 21:08

## 2019-01-01 RX ADMIN — BROMOCRIPTINE MESYLATE 5 MG: 2.5 TABLET ORAL at 11:07

## 2019-01-01 RX ADMIN — SODIUM CHLORIDE AND POTASSIUM CHLORIDE: .9; .15 SOLUTION INTRAVENOUS at 18:52

## 2019-01-01 RX ADMIN — FOLIC ACID 1 MG: 1 TABLET ORAL at 08:13

## 2019-01-01 RX ADMIN — BUSPIRONE HYDROCHLORIDE 5 MG: 5 TABLET ORAL at 20:38

## 2019-01-01 RX ADMIN — TRAZODONE HYDROCHLORIDE 100 MG: 100 TABLET ORAL at 21:01

## 2019-01-01 RX ADMIN — BROMOCRIPTINE MESYLATE 5 MG: 2.5 TABLET ORAL at 06:20

## 2019-01-01 RX ADMIN — FOLIC ACID 1 MG: 1 TABLET ORAL at 09:27

## 2019-01-01 RX ADMIN — QUETIAPINE FUMARATE 25 MG: 25 TABLET ORAL at 17:57

## 2019-01-01 RX ADMIN — BROMOCRIPTINE MESYLATE 5 MG: 2.5 TABLET ORAL at 10:31

## 2019-01-01 RX ADMIN — Medication 100 MG: at 08:25

## 2019-01-01 RX ADMIN — IOPAMIDOL 80 ML: 755 INJECTION, SOLUTION INTRAVENOUS at 19:40

## 2019-01-01 RX ADMIN — BROMOCRIPTINE MESYLATE 5 MG: 2.5 TABLET ORAL at 05:46

## 2019-01-01 RX ADMIN — ZIPRASIDONE MESYLATE 10 MG: 20 INJECTION, POWDER, LYOPHILIZED, FOR SOLUTION INTRAMUSCULAR at 12:53

## 2019-01-01 RX ADMIN — METHYLPHENIDATE HYDROCHLORIDE 10 MG: 5 TABLET ORAL at 06:05

## 2019-01-01 RX ADMIN — ENOXAPARIN SODIUM 40 MG: 100 INJECTION SUBCUTANEOUS at 08:33

## 2019-01-01 RX ADMIN — METHYLPHENIDATE HYDROCHLORIDE 10 MG: 5 TABLET ORAL at 06:34

## 2019-01-01 RX ADMIN — METHYLPHENIDATE HYDROCHLORIDE 10 MG: 5 TABLET ORAL at 05:28

## 2019-01-01 RX ADMIN — DEXTROSE MONOHYDRATE: 50 INJECTION, SOLUTION INTRAVENOUS at 20:20

## 2019-01-01 RX ADMIN — SENNOSIDES 17.2 MG: 8.6 TABLET, FILM COATED ORAL at 23:19

## 2019-01-01 RX ADMIN — QUETIAPINE FUMARATE 25 MG: 25 TABLET ORAL at 11:45

## 2019-01-01 RX ADMIN — FAMOTIDINE 20 MG: 20 TABLET ORAL at 20:09

## 2019-01-01 RX ADMIN — PSYLLIUM HUSK 1 PACKET: 3.4 GRANULE ORAL at 08:13

## 2019-01-01 RX ADMIN — FOLIC ACID 1 MG: 1 TABLET ORAL at 08:56

## 2019-01-01 RX ADMIN — METHYLPHENIDATE HYDROCHLORIDE 10 MG: 5 TABLET ORAL at 12:51

## 2019-01-01 RX ADMIN — SULFAMETHOXAZOLE AND TRIMETHOPRIM 1 TABLET: 800; 160 TABLET ORAL at 20:42

## 2019-01-01 RX ADMIN — PSYLLIUM HUSK 1 PACKET: 3.4 GRANULE ORAL at 07:56

## 2019-01-01 RX ADMIN — FOLIC ACID 1 MG: 1 TABLET ORAL at 08:33

## 2019-01-01 RX ADMIN — WATER 1000 MG: 1 INJECTION INTRAMUSCULAR; INTRAVENOUS; SUBCUTANEOUS at 18:39

## 2019-01-01 RX ADMIN — FOLIC ACID 1 MG: 1 TABLET ORAL at 08:38

## 2019-01-01 RX ADMIN — BUSPIRONE HYDROCHLORIDE 5 MG: 5 TABLET ORAL at 21:50

## 2019-01-01 RX ADMIN — ACETAMINOPHEN 650 MG: 650 SUPPOSITORY RECTAL at 14:12

## 2019-01-01 RX ADMIN — SULFAMETHOXAZOLE AND TRIMETHOPRIM 1 TABLET: 800; 160 TABLET ORAL at 08:17

## 2019-01-01 RX ADMIN — BROMOCRIPTINE MESYLATE 5 MG: 2.5 TABLET ORAL at 06:05

## 2019-01-01 RX ADMIN — BROMOCRIPTINE MESYLATE 5 MG: 2.5 TABLET ORAL at 06:44

## 2019-01-01 RX ADMIN — ENOXAPARIN SODIUM 40 MG: 100 INJECTION SUBCUTANEOUS at 10:02

## 2019-01-01 RX ADMIN — LOSARTAN POTASSIUM 50 MG: 50 TABLET, FILM COATED ORAL at 08:43

## 2019-01-01 RX ADMIN — PSYLLIUM HUSK 1 PACKET: 3.4 GRANULE ORAL at 13:57

## 2019-01-01 RX ADMIN — METHYLPHENIDATE HYDROCHLORIDE 10 MG: 5 TABLET ORAL at 13:17

## 2019-01-01 RX ADMIN — ENOXAPARIN SODIUM 40 MG: 100 INJECTION SUBCUTANEOUS at 08:14

## 2019-01-01 RX ADMIN — DOXAZOSIN 4 MG: 4 TABLET ORAL at 08:33

## 2019-01-01 RX ADMIN — METHYLPHENIDATE HYDROCHLORIDE 10 MG: 10 TABLET ORAL at 08:22

## 2019-01-01 RX ADMIN — ZIPRASIDONE MESYLATE 10 MG: 20 INJECTION, POWDER, LYOPHILIZED, FOR SOLUTION INTRAMUSCULAR at 19:36

## 2019-01-01 RX ADMIN — HEPARIN SODIUM 5000 UNITS: 5000 INJECTION INTRAVENOUS; SUBCUTANEOUS at 20:38

## 2019-01-01 RX ADMIN — FOLIC ACID 1 MG: 1 TABLET ORAL at 08:58

## 2019-01-01 RX ADMIN — FOLIC ACID 1 MG: 1 TABLET ORAL at 09:13

## 2019-01-01 RX ADMIN — METHYLPHENIDATE HYDROCHLORIDE 10 MG: 10 TABLET ORAL at 11:31

## 2019-01-01 RX ADMIN — LOSARTAN POTASSIUM 50 MG: 50 TABLET, FILM COATED ORAL at 09:27

## 2019-01-01 RX ADMIN — TRAZODONE HYDROCHLORIDE 100 MG: 100 TABLET ORAL at 20:55

## 2019-01-01 RX ADMIN — PSYLLIUM HUSK 1 PACKET: 3.4 GRANULE ORAL at 17:38

## 2019-01-01 RX ADMIN — ENOXAPARIN SODIUM 40 MG: 100 INJECTION SUBCUTANEOUS at 08:16

## 2019-01-01 RX ADMIN — Medication 100 MG: at 09:16

## 2019-01-01 RX ADMIN — NYSTATIN 500000 UNITS: 100000 SUSPENSION ORAL at 14:41

## 2019-01-01 RX ADMIN — NYSTATIN 500000 UNITS: 100000 SUSPENSION ORAL at 20:28

## 2019-01-01 RX ADMIN — METHYLPHENIDATE HYDROCHLORIDE 10 MG: 10 TABLET ORAL at 07:58

## 2019-01-01 RX ADMIN — FAMOTIDINE 20 MG: 20 TABLET ORAL at 22:59

## 2019-01-01 RX ADMIN — ENOXAPARIN SODIUM 40 MG: 100 INJECTION SUBCUTANEOUS at 09:15

## 2019-01-01 RX ADMIN — FAMOTIDINE 20 MG: 20 TABLET ORAL at 08:38

## 2019-01-01 RX ADMIN — METOPROLOL TARTRATE 25 MG: 25 TABLET ORAL at 20:14

## 2019-01-01 RX ADMIN — FAMOTIDINE 20 MG: 20 TABLET ORAL at 20:04

## 2019-01-01 RX ADMIN — BROMOCRIPTINE MESYLATE 5 MG: 2.5 TABLET ORAL at 12:51

## 2019-01-01 RX ADMIN — FAMOTIDINE 20 MG: 20 TABLET ORAL at 19:13

## 2019-01-01 RX ADMIN — PSYLLIUM HUSK 1 PACKET: 3.4 GRANULE ORAL at 09:17

## 2019-01-01 RX ADMIN — PSYLLIUM HUSK 1 PACKET: 3.4 GRANULE ORAL at 17:31

## 2019-01-01 RX ADMIN — LOSARTAN POTASSIUM 50 MG: 50 TABLET, FILM COATED ORAL at 08:51

## 2019-01-01 RX ADMIN — FAMOTIDINE 20 MG: 20 TABLET, FILM COATED ORAL at 10:41

## 2019-01-01 RX ADMIN — LOSARTAN POTASSIUM 50 MG: 50 TABLET, FILM COATED ORAL at 10:32

## 2019-01-01 RX ADMIN — ENOXAPARIN SODIUM 40 MG: 100 INJECTION SUBCUTANEOUS at 10:32

## 2019-01-01 RX ADMIN — BUSPIRONE HYDROCHLORIDE 5 MG: 5 TABLET ORAL at 08:33

## 2019-01-01 RX ADMIN — ENOXAPARIN SODIUM 40 MG: 100 INJECTION SUBCUTANEOUS at 09:40

## 2019-01-01 RX ADMIN — LOSARTAN POTASSIUM 50 MG: 50 TABLET, FILM COATED ORAL at 09:23

## 2019-01-01 RX ADMIN — PSYLLIUM HUSK 1 PACKET: 3.4 GRANULE ORAL at 17:56

## 2019-01-01 RX ADMIN — PSYLLIUM HUSK 1 PACKET: 3.4 GRANULE ORAL at 14:44

## 2019-01-01 RX ADMIN — PIPERACILLIN AND TAZOBACTAM 3.38 G: 3; .375 INJECTION, POWDER, LYOPHILIZED, FOR SOLUTION INTRAVENOUS at 22:20

## 2019-01-01 RX ADMIN — SULFAMETHOXAZOLE AND TRIMETHOPRIM 1 TABLET: 800; 160 TABLET ORAL at 08:50

## 2019-01-01 RX ADMIN — CEFTRIAXONE SODIUM 1 G: 1 INJECTION, POWDER, FOR SOLUTION INTRAMUSCULAR; INTRAVENOUS at 14:24

## 2019-01-01 RX ADMIN — SULFAMETHOXAZOLE AND TRIMETHOPRIM 1 TABLET: 800; 160 TABLET ORAL at 19:19

## 2019-01-01 RX ADMIN — PSYLLIUM HUSK 1 PACKET: 3.4 GRANULE ORAL at 09:27

## 2019-01-01 RX ADMIN — Medication 100 MG: at 09:13

## 2019-01-01 RX ADMIN — FAMOTIDINE 20 MG: 20 TABLET ORAL at 21:11

## 2019-01-01 RX ADMIN — NYSTATIN 500000 UNITS: 100000 SUSPENSION ORAL at 19:13

## 2019-01-01 RX ADMIN — METHYLPHENIDATE HYDROCHLORIDE 10 MG: 5 TABLET ORAL at 06:21

## 2019-01-01 RX ADMIN — PSYLLIUM HUSK 1 PACKET: 3.4 GRANULE ORAL at 16:45

## 2019-01-01 RX ADMIN — LOSARTAN POTASSIUM 50 MG: 50 TABLET, FILM COATED ORAL at 11:13

## 2019-01-01 RX ADMIN — PSYLLIUM HUSK 1 PACKET: 3.4 GRANULE ORAL at 12:56

## 2019-01-01 RX ADMIN — BROMOCRIPTINE MESYLATE 5 MG: 2.5 TABLET ORAL at 11:26

## 2019-01-01 RX ADMIN — ENOXAPARIN SODIUM 40 MG: 100 INJECTION SUBCUTANEOUS at 08:30

## 2019-01-01 RX ADMIN — BROMOCRIPTINE MESYLATE 5 MG: 2.5 TABLET ORAL at 05:28

## 2019-01-01 RX ADMIN — PIPERACILLIN AND TAZOBACTAM 2.25 G: 2; .25 INJECTION, POWDER, LYOPHILIZED, FOR SOLUTION INTRAVENOUS at 12:55

## 2019-01-01 RX ADMIN — PSYLLIUM HUSK 1 PACKET: 3.4 GRANULE ORAL at 12:28

## 2019-01-01 RX ADMIN — BROMOCRIPTINE MESYLATE 5 MG: 2.5 TABLET ORAL at 06:12

## 2019-01-01 RX ADMIN — PSYLLIUM HUSK 1 PACKET: 3.4 GRANULE ORAL at 09:33

## 2019-01-01 RX ADMIN — LOSARTAN POTASSIUM 50 MG: 50 TABLET, FILM COATED ORAL at 08:33

## 2019-01-01 RX ADMIN — Medication 100 MG: at 08:51

## 2019-01-01 RX ADMIN — NYSTATIN 500000 UNITS: 100000 SUSPENSION ORAL at 09:15

## 2019-01-01 RX ADMIN — PSYLLIUM HUSK 1 PACKET: 3.4 GRANULE ORAL at 18:03

## 2019-01-01 RX ADMIN — QUETIAPINE FUMARATE 25 MG: 25 TABLET ORAL at 15:29

## 2019-01-01 RX ADMIN — PIPERACILLIN AND TAZOBACTAM 3.38 G: 3; .375 INJECTION, POWDER, LYOPHILIZED, FOR SOLUTION INTRAVENOUS at 06:38

## 2019-01-01 RX ADMIN — FAMOTIDINE 20 MG: 20 TABLET ORAL at 20:35

## 2019-01-01 RX ADMIN — METOPROLOL TARTRATE 25 MG: 25 TABLET ORAL at 10:41

## 2019-01-01 RX ADMIN — BUSPIRONE HYDROCHLORIDE 5 MG: 5 TABLET ORAL at 07:58

## 2019-01-01 RX ADMIN — SENNOSIDES 17.2 MG: 8.6 TABLET, FILM COATED ORAL at 19:46

## 2019-01-01 RX ADMIN — TRAZODONE HYDROCHLORIDE 100 MG: 100 TABLET ORAL at 19:53

## 2019-01-01 RX ADMIN — QUETIAPINE FUMARATE 25 MG: 25 TABLET ORAL at 19:58

## 2019-01-01 RX ADMIN — LOSARTAN POTASSIUM 50 MG: 50 TABLET, FILM COATED ORAL at 08:06

## 2019-01-01 RX ADMIN — TAMSULOSIN HYDROCHLORIDE 0.4 MG: 0.4 CAPSULE ORAL at 21:09

## 2019-01-01 RX ADMIN — FAMOTIDINE 20 MG: 20 TABLET ORAL at 19:20

## 2019-01-01 RX ADMIN — TRAZODONE HYDROCHLORIDE 100 MG: 100 TABLET ORAL at 23:00

## 2019-01-01 RX ADMIN — METHYLPHENIDATE HYDROCHLORIDE 10 MG: 5 TABLET ORAL at 06:22

## 2019-01-01 RX ADMIN — BROMOCRIPTINE MESYLATE 5 MG: 2.5 TABLET ORAL at 08:33

## 2019-01-01 RX ADMIN — TRAZODONE HYDROCHLORIDE 100 MG: 100 TABLET ORAL at 20:58

## 2019-01-01 RX ADMIN — BROMOCRIPTINE MESYLATE 5 MG: 2.5 TABLET ORAL at 11:18

## 2019-01-01 RX ADMIN — FAMOTIDINE 20 MG: 20 TABLET ORAL at 08:33

## 2019-01-01 RX ADMIN — FAMOTIDINE 20 MG: 20 TABLET ORAL at 09:34

## 2019-01-01 RX ADMIN — FAMOTIDINE 20 MG: 20 TABLET ORAL at 20:46

## 2019-01-01 RX ADMIN — BROMOCRIPTINE MESYLATE 5 MG: 2.5 TABLET ORAL at 14:55

## 2019-01-01 RX ADMIN — ENOXAPARIN SODIUM 40 MG: 100 INJECTION SUBCUTANEOUS at 09:14

## 2019-01-01 RX ADMIN — LOSARTAN POTASSIUM 50 MG: 50 TABLET, FILM COATED ORAL at 09:34

## 2019-01-01 RX ADMIN — FOLIC ACID 1 MG: 1 TABLET ORAL at 08:25

## 2019-01-01 RX ADMIN — QUETIAPINE FUMARATE 37.5 MG: 25 TABLET ORAL at 08:38

## 2019-01-01 RX ADMIN — BUSPIRONE HYDROCHLORIDE 5 MG: 5 TABLET ORAL at 10:58

## 2019-01-01 RX ADMIN — TRAZODONE HYDROCHLORIDE 100 MG: 100 TABLET ORAL at 20:04

## 2019-01-01 RX ADMIN — PIPERACILLIN AND TAZOBACTAM 3.38 G: 3; .375 INJECTION, POWDER, LYOPHILIZED, FOR SOLUTION INTRAVENOUS at 13:45

## 2019-01-01 RX ADMIN — FAMOTIDINE 20 MG: 20 TABLET ORAL at 10:04

## 2019-01-01 RX ADMIN — QUETIAPINE FUMARATE 25 MG: 25 TABLET ORAL at 14:16

## 2019-01-01 RX ADMIN — METHYLPHENIDATE HYDROCHLORIDE 5 MG: 5 TABLET ORAL at 11:21

## 2019-01-01 RX ADMIN — SODIUM CHLORIDE: 4.5 INJECTION, SOLUTION INTRAVENOUS at 14:15

## 2019-01-01 RX ADMIN — BROMOCRIPTINE MESYLATE 5 MG: 2.5 TABLET ORAL at 13:43

## 2019-01-01 RX ADMIN — DOXAZOSIN 4 MG: 4 TABLET ORAL at 10:41

## 2019-01-01 RX ADMIN — FAMOTIDINE 20 MG: 20 TABLET ORAL at 20:42

## 2019-01-01 RX ADMIN — FOLIC ACID 1 MG: 1 TABLET ORAL at 08:50

## 2019-01-01 RX ADMIN — METHYLPHENIDATE HYDROCHLORIDE 10 MG: 5 TABLET ORAL at 12:55

## 2019-01-01 RX ADMIN — BUSPIRONE HYDROCHLORIDE 5 MG: 5 TABLET ORAL at 09:35

## 2019-01-01 RX ADMIN — METHYLPHENIDATE HYDROCHLORIDE 10 MG: 5 TABLET ORAL at 13:13

## 2019-01-01 RX ADMIN — TRAZODONE HYDROCHLORIDE 100 MG: 100 TABLET ORAL at 21:11

## 2019-01-01 RX ADMIN — LOSARTAN POTASSIUM 50 MG: 50 TABLET, FILM COATED ORAL at 08:13

## 2019-01-01 RX ADMIN — TRAZODONE HYDROCHLORIDE 100 MG: 100 TABLET ORAL at 00:10

## 2019-01-01 RX ADMIN — Medication 100 MG: at 09:27

## 2019-01-01 RX ADMIN — FOLIC ACID 1 MG: 1 TABLET ORAL at 08:17

## 2019-01-01 RX ADMIN — PSYLLIUM HUSK 1 PACKET: 3.4 GRANULE ORAL at 11:18

## 2019-01-01 RX ADMIN — QUETIAPINE FUMARATE 25 MG: 25 TABLET ORAL at 17:09

## 2019-01-01 RX ADMIN — BUSPIRONE HYDROCHLORIDE 5 MG: 5 TABLET ORAL at 09:33

## 2019-01-01 RX ADMIN — LOSARTAN POTASSIUM 50 MG: 50 TABLET, FILM COATED ORAL at 08:25

## 2019-01-01 RX ADMIN — POTASSIUM BICARBONATE 40 MEQ: 782 TABLET, EFFERVESCENT ORAL at 15:17

## 2019-01-01 RX ADMIN — FAMOTIDINE 20 MG: 20 TABLET ORAL at 09:27

## 2019-01-01 RX ADMIN — DOXAZOSIN 2 MG: 4 TABLET ORAL at 07:59

## 2019-01-01 RX ADMIN — METHYLPHENIDATE HYDROCHLORIDE 5 MG: 5 TABLET ORAL at 05:29

## 2019-01-01 RX ADMIN — TRAZODONE HYDROCHLORIDE 100 MG: 100 TABLET ORAL at 19:46

## 2019-01-01 RX ADMIN — METHYLPHENIDATE HYDROCHLORIDE 5 MG: 5 TABLET ORAL at 06:05

## 2019-01-01 RX ADMIN — FOLIC ACID 1 MG: 1 TABLET ORAL at 08:43

## 2019-01-01 RX ADMIN — FAMOTIDINE 20 MG: 20 TABLET ORAL at 20:58

## 2019-01-01 RX ADMIN — PIPERACILLIN AND TAZOBACTAM 2.25 G: 2; .25 INJECTION, POWDER, LYOPHILIZED, FOR SOLUTION INTRAVENOUS at 04:32

## 2019-01-01 RX ADMIN — FAMOTIDINE 20 MG: 20 TABLET ORAL at 09:40

## 2019-01-01 RX ADMIN — PSYLLIUM HUSK 1 PACKET: 3.4 GRANULE ORAL at 09:09

## 2019-01-01 RX ADMIN — PSYLLIUM HUSK 1 PACKET: 3.4 GRANULE ORAL at 17:51

## 2019-01-01 RX ADMIN — BROMOCRIPTINE MESYLATE 5 MG: 2.5 TABLET ORAL at 05:29

## 2019-01-01 RX ADMIN — FAMOTIDINE 20 MG: 20 TABLET ORAL at 09:15

## 2019-01-01 RX ADMIN — FOLIC ACID 1 MG: 1 TABLET ORAL at 09:15

## 2019-01-01 RX ADMIN — Medication 100 MG: at 09:33

## 2019-01-01 RX ADMIN — FOLIC ACID 1 MG: 1 TABLET ORAL at 08:06

## 2019-01-01 RX ADMIN — Medication 100 MG: at 10:32

## 2019-01-01 RX ADMIN — TRAZODONE HYDROCHLORIDE 100 MG: 100 TABLET ORAL at 19:20

## 2019-01-01 RX ADMIN — TRAZODONE HYDROCHLORIDE 100 MG: 100 TABLET ORAL at 20:42

## 2019-01-01 RX ADMIN — Medication 100 MG: at 08:13

## 2019-01-01 RX ADMIN — QUETIAPINE FUMARATE 37.5 MG: 25 TABLET ORAL at 13:09

## 2019-01-01 RX ADMIN — PIPERACILLIN AND TAZOBACTAM 3.38 G: 3; .375 INJECTION, POWDER, LYOPHILIZED, FOR SOLUTION INTRAVENOUS at 20:56

## 2019-01-01 RX ADMIN — METHYLPHENIDATE HYDROCHLORIDE 10 MG: 5 TABLET ORAL at 05:20

## 2019-01-01 RX ADMIN — NYSTATIN 500000 UNITS: 100000 SUSPENSION ORAL at 17:18

## 2019-01-01 RX ADMIN — QUETIAPINE FUMARATE 25 MG: 25 TABLET ORAL at 13:36

## 2019-01-01 RX ADMIN — FOLIC ACID 1 MG: 1 TABLET ORAL at 08:47

## 2019-01-01 RX ADMIN — FLUCONAZOLE 100 MG: 100 TABLET ORAL at 08:56

## 2019-01-01 RX ADMIN — ENOXAPARIN SODIUM 40 MG: 100 INJECTION SUBCUTANEOUS at 09:09

## 2019-01-01 RX ADMIN — LOSARTAN POTASSIUM 50 MG: 50 TABLET, FILM COATED ORAL at 09:33

## 2019-01-01 RX ADMIN — WATER 1.2 ML: 1 INJECTION INTRAMUSCULAR; INTRAVENOUS; SUBCUTANEOUS at 11:17

## 2019-01-01 RX ADMIN — FOLIC ACID 1 MG: 1 TABLET ORAL at 10:12

## 2019-01-01 RX ADMIN — Medication 100 MG: at 10:02

## 2019-01-01 RX ADMIN — Medication 100 MG: at 09:09

## 2019-01-01 RX ADMIN — FAMOTIDINE 20 MG: 20 TABLET ORAL at 08:51

## 2019-01-01 RX ADMIN — QUETIAPINE FUMARATE 25 MG: 25 TABLET ORAL at 10:47

## 2019-01-01 RX ADMIN — ENOXAPARIN SODIUM 40 MG: 100 INJECTION SUBCUTANEOUS at 08:56

## 2019-01-01 RX ADMIN — ENOXAPARIN SODIUM 40 MG: 100 INJECTION SUBCUTANEOUS at 08:46

## 2019-01-01 RX ADMIN — Medication 100 MG: at 08:16

## 2019-01-01 RX ADMIN — FLUCONAZOLE 100 MG: 100 TABLET ORAL at 08:33

## 2019-01-01 RX ADMIN — ENOXAPARIN SODIUM 40 MG: 100 INJECTION SUBCUTANEOUS at 10:14

## 2019-01-01 RX ADMIN — METHYLPHENIDATE HYDROCHLORIDE 10 MG: 5 TABLET ORAL at 05:41

## 2019-01-01 RX ADMIN — MAGNESIUM HYDROXIDE 30 ML: 400 SUSPENSION ORAL at 20:42

## 2019-01-01 RX ADMIN — BROMOCRIPTINE MESYLATE 5 MG: 2.5 TABLET ORAL at 13:05

## 2019-01-01 RX ADMIN — FAMOTIDINE 20 MG: 20 TABLET, FILM COATED ORAL at 08:35

## 2019-01-01 RX ADMIN — Medication 100 MG: at 20:09

## 2019-01-01 RX ADMIN — SULFAMETHOXAZOLE AND TRIMETHOPRIM 1 TABLET: 800; 160 TABLET ORAL at 10:32

## 2019-01-01 RX ADMIN — TRAZODONE HYDROCHLORIDE 100 MG: 100 TABLET ORAL at 21:09

## 2019-01-01 RX ADMIN — FOLIC ACID 1 MG: 1 TABLET ORAL at 09:09

## 2019-01-01 RX ADMIN — NYSTATIN 500000 UNITS: 100000 SUSPENSION ORAL at 08:49

## 2019-01-01 RX ADMIN — Medication 100 MG: at 10:58

## 2019-01-01 RX ADMIN — QUETIAPINE FUMARATE 37.5 MG: 25 TABLET ORAL at 17:41

## 2019-01-01 RX ADMIN — QUETIAPINE FUMARATE 37.5 MG: 25 TABLET ORAL at 15:09

## 2019-01-01 RX ADMIN — Medication 100 MG: at 09:35

## 2019-01-01 RX ADMIN — Medication 100 MG: at 20:42

## 2019-01-01 RX ADMIN — PIPERACILLIN AND TAZOBACTAM 3.38 G: 3; .375 INJECTION, POWDER, LYOPHILIZED, FOR SOLUTION INTRAVENOUS at 03:34

## 2019-01-01 RX ADMIN — METHYLPHENIDATE HYDROCHLORIDE 5 MG: 5 TABLET ORAL at 11:19

## 2019-01-01 RX ADMIN — ACETAMINOPHEN ORAL SOLUTION 1000 MG: 650 SOLUTION ORAL at 08:25

## 2019-01-01 RX ADMIN — FLUCONAZOLE 100 MG: 100 TABLET ORAL at 08:13

## 2019-01-01 RX ADMIN — FOLIC ACID 1 MG: 1 TABLET ORAL at 11:13

## 2019-01-01 RX ADMIN — NYSTATIN 500000 UNITS: 100000 SUSPENSION ORAL at 12:50

## 2019-01-01 RX ADMIN — QUETIAPINE FUMARATE 25 MG: 25 TABLET ORAL at 21:11

## 2019-01-01 RX ADMIN — TAMSULOSIN HYDROCHLORIDE 0.4 MG: 0.4 CAPSULE ORAL at 21:25

## 2019-01-01 RX ADMIN — PIPERACILLIN AND TAZOBACTAM 2.25 G: 2; .25 INJECTION, POWDER, LYOPHILIZED, FOR SOLUTION INTRAVENOUS at 20:46

## 2019-01-01 RX ADMIN — BROMOCRIPTINE MESYLATE 5 MG: 2.5 TABLET ORAL at 13:13

## 2019-01-01 RX ADMIN — HEPARIN SODIUM 5000 UNITS: 5000 INJECTION INTRAVENOUS; SUBCUTANEOUS at 20:26

## 2019-01-01 RX ADMIN — MAGNESIUM HYDROXIDE 30 ML: 400 SUSPENSION ORAL at 05:28

## 2019-01-01 RX ADMIN — SODIUM CHLORIDE: 9 INJECTION, SOLUTION INTRAVENOUS at 18:59

## 2019-01-01 RX ADMIN — BROMOCRIPTINE MESYLATE 5 MG: 2.5 TABLET ORAL at 08:41

## 2019-01-01 RX ADMIN — METHYLPHENIDATE HYDROCHLORIDE 5 MG: 5 TABLET ORAL at 06:12

## 2019-01-01 RX ADMIN — Medication 100 MG: at 11:21

## 2019-01-01 RX ADMIN — BROMOCRIPTINE MESYLATE 5 MG: 2.5 TABLET ORAL at 06:34

## 2019-01-01 RX ADMIN — TRAZODONE HYDROCHLORIDE 100 MG: 100 TABLET ORAL at 20:00

## 2019-01-01 RX ADMIN — FAMOTIDINE 20 MG: 20 TABLET ORAL at 20:15

## 2019-01-01 RX ADMIN — QUETIAPINE FUMARATE 25 MG: 25 TABLET ORAL at 20:46

## 2019-01-01 RX ADMIN — BROMOCRIPTINE MESYLATE 5 MG: 2.5 TABLET ORAL at 06:09

## 2019-01-01 RX ADMIN — PSYLLIUM HUSK 1 PACKET: 3.4 GRANULE ORAL at 09:41

## 2019-01-01 RX ADMIN — METHYLPHENIDATE HYDROCHLORIDE 5 MG: 5 TABLET ORAL at 11:07

## 2019-01-01 RX ADMIN — PSYLLIUM HUSK 1 PACKET: 3.4 GRANULE ORAL at 17:35

## 2019-01-01 RX ADMIN — QUETIAPINE FUMARATE 25 MG: 25 TABLET ORAL at 21:36

## 2019-01-01 RX ADMIN — METHYLPHENIDATE HYDROCHLORIDE 10 MG: 10 TABLET ORAL at 12:59

## 2019-01-01 RX ADMIN — PIPERACILLIN AND TAZOBACTAM 3.38 G: 3; .375 INJECTION, POWDER, LYOPHILIZED, FOR SOLUTION INTRAVENOUS at 20:59

## 2019-01-01 RX ADMIN — FAMOTIDINE 20 MG: 20 TABLET ORAL at 10:32

## 2019-01-01 RX ADMIN — HEPARIN SODIUM 5000 UNITS: 5000 INJECTION INTRAVENOUS; SUBCUTANEOUS at 00:28

## 2019-01-01 RX ADMIN — METHYLPHENIDATE HYDROCHLORIDE 10 MG: 5 TABLET ORAL at 06:29

## 2019-01-01 RX ADMIN — TRAZODONE HYDROCHLORIDE 100 MG: 100 TABLET ORAL at 21:25

## 2019-01-01 RX ADMIN — FAMOTIDINE 20 MG: 20 TABLET ORAL at 09:09

## 2019-01-01 RX ADMIN — NYSTATIN 500000 UNITS: 100000 SUSPENSION ORAL at 16:33

## 2019-01-01 RX ADMIN — PSYLLIUM HUSK 1 PACKET: 3.4 GRANULE ORAL at 10:14

## 2019-01-01 RX ADMIN — METHYLPHENIDATE HYDROCHLORIDE 10 MG: 5 TABLET ORAL at 13:43

## 2019-01-01 RX ADMIN — BUSPIRONE HYDROCHLORIDE 5 MG: 5 TABLET ORAL at 20:01

## 2019-01-01 RX ADMIN — FAMOTIDINE 20 MG: 20 TABLET, FILM COATED ORAL at 09:42

## 2019-01-01 RX ADMIN — METHYLPHENIDATE HYDROCHLORIDE 10 MG: 5 TABLET ORAL at 10:47

## 2019-01-01 RX ADMIN — NYSTATIN 500000 UNITS: 100000 SUSPENSION ORAL at 17:34

## 2019-01-01 RX ADMIN — PIPERACILLIN AND TAZOBACTAM 3.38 G: 3; .375 INJECTION, POWDER, LYOPHILIZED, FOR SOLUTION INTRAVENOUS at 11:31

## 2019-01-01 RX ADMIN — BROMOCRIPTINE MESYLATE 5 MG: 2.5 TABLET ORAL at 12:18

## 2019-01-01 RX ADMIN — FAMOTIDINE 20 MG: 20 TABLET, FILM COATED ORAL at 08:22

## 2019-01-01 RX ADMIN — METHYLPHENIDATE HYDROCHLORIDE 5 MG: 5 TABLET ORAL at 12:49

## 2019-01-01 RX ADMIN — FAMOTIDINE 20 MG: 20 TABLET, FILM COATED ORAL at 07:58

## 2019-01-01 RX ADMIN — TAMSULOSIN HYDROCHLORIDE 0.4 MG: 0.4 CAPSULE ORAL at 17:40

## 2019-01-01 RX ADMIN — FAMOTIDINE 20 MG: 20 TABLET ORAL at 08:34

## 2019-01-01 RX ADMIN — QUETIAPINE FUMARATE 37.5 MG: 25 TABLET ORAL at 20:47

## 2019-01-01 RX ADMIN — WATER 1.2 ML: 1 INJECTION INTRAMUSCULAR; INTRAVENOUS; SUBCUTANEOUS at 22:09

## 2019-01-01 RX ADMIN — SULFAMETHOXAZOLE AND TRIMETHOPRIM 1 TABLET: 800; 160 TABLET ORAL at 19:46

## 2019-01-01 RX ADMIN — FAMOTIDINE 20 MG: 20 TABLET, FILM COATED ORAL at 09:35

## 2019-01-01 RX ADMIN — ENOXAPARIN SODIUM 40 MG: 100 INJECTION SUBCUTANEOUS at 08:42

## 2019-01-01 RX ADMIN — Medication 100 MG: at 08:58

## 2019-01-01 RX ADMIN — NYSTATIN 500000 UNITS: 100000 SUSPENSION ORAL at 17:15

## 2019-01-01 RX ADMIN — FAMOTIDINE 20 MG: 20 TABLET ORAL at 08:30

## 2019-01-01 RX ADMIN — METHYLPHENIDATE HYDROCHLORIDE 10 MG: 5 TABLET ORAL at 06:20

## 2019-01-01 RX ADMIN — TRAZODONE HYDROCHLORIDE 100 MG: 100 TABLET ORAL at 21:50

## 2019-01-01 RX ADMIN — METHYLPHENIDATE HYDROCHLORIDE 10 MG: 5 TABLET ORAL at 11:45

## 2019-01-01 RX ADMIN — HEPARIN SODIUM 5000 UNITS: 5000 INJECTION INTRAVENOUS; SUBCUTANEOUS at 20:46

## 2019-01-01 RX ADMIN — Medication 100 MG: at 08:38

## 2019-01-01 RX ADMIN — Medication 100 MG: at 19:21

## 2019-01-01 RX ADMIN — QUETIAPINE FUMARATE 37.5 MG: 25 TABLET ORAL at 20:48

## 2019-01-01 RX ADMIN — DEXTROSE MONOHYDRATE: 50 INJECTION, SOLUTION INTRAVENOUS at 22:26

## 2019-01-01 RX ADMIN — QUETIAPINE FUMARATE 25 MG: 25 TABLET ORAL at 18:16

## 2019-01-01 RX ADMIN — QUETIAPINE FUMARATE 37.5 MG: 25 TABLET ORAL at 11:17

## 2019-01-01 RX ADMIN — ZIPRASIDONE MESYLATE 10 MG: 20 INJECTION, POWDER, LYOPHILIZED, FOR SOLUTION INTRAMUSCULAR at 15:50

## 2019-01-01 RX ADMIN — TRAZODONE HYDROCHLORIDE 100 MG: 100 TABLET ORAL at 20:15

## 2019-01-01 RX ADMIN — FOLIC ACID 1 MG: 1 TABLET ORAL at 09:16

## 2019-01-01 RX ADMIN — LOSARTAN POTASSIUM 50 MG: 50 TABLET, FILM COATED ORAL at 08:48

## 2019-01-01 RX ADMIN — PSYLLIUM HUSK 1 PACKET: 3.4 GRANULE ORAL at 17:57

## 2019-01-01 RX ADMIN — PSYLLIUM HUSK 1 PACKET: 3.4 GRANULE ORAL at 11:30

## 2019-01-01 RX ADMIN — FAMOTIDINE 20 MG: 20 TABLET ORAL at 08:42

## 2019-01-01 RX ADMIN — Medication 100 MG: at 19:54

## 2019-01-01 RX ADMIN — FOLIC ACID 1 MG: 1 TABLET ORAL at 08:51

## 2019-01-01 RX ADMIN — NYSTATIN 500000 UNITS: 100000 SUSPENSION ORAL at 15:06

## 2019-01-01 RX ADMIN — FOLIC ACID 1 MG: 1 TABLET ORAL at 10:58

## 2019-01-01 RX ADMIN — Medication 100 MG: at 08:46

## 2019-01-01 RX ADMIN — FOLIC ACID 1 MG: 1 TABLET ORAL at 10:32

## 2019-01-01 RX ADMIN — QUETIAPINE FUMARATE 37.5 MG: 25 TABLET ORAL at 08:32

## 2019-01-01 RX ADMIN — LOSARTAN POTASSIUM 50 MG: 50 TABLET, FILM COATED ORAL at 10:04

## 2019-01-01 RX ADMIN — QUETIAPINE FUMARATE 25 MG: 25 TABLET ORAL at 21:25

## 2019-01-01 RX ADMIN — BUSPIRONE HYDROCHLORIDE 5 MG: 5 TABLET ORAL at 17:30

## 2019-01-01 RX ADMIN — BUSPIRONE HYDROCHLORIDE 5 MG: 5 TABLET ORAL at 20:47

## 2019-01-01 RX ADMIN — PSYLLIUM HUSK 1 PACKET: 3.4 GRANULE ORAL at 10:36

## 2019-01-01 RX ADMIN — METHYLPHENIDATE HYDROCHLORIDE 10 MG: 5 TABLET ORAL at 06:12

## 2019-01-01 RX ADMIN — Medication 100 MG: at 19:58

## 2019-01-01 RX ADMIN — Medication 100 MG: at 08:17

## 2019-01-01 RX ADMIN — Medication 100 MG: at 08:56

## 2019-01-01 RX ADMIN — PSYLLIUM HUSK 1 PACKET: 3.4 GRANULE ORAL at 18:16

## 2019-01-01 RX ADMIN — BROMOCRIPTINE MESYLATE 5 MG: 2.5 TABLET ORAL at 13:17

## 2019-01-01 RX ADMIN — TRAZODONE HYDROCHLORIDE 100 MG: 100 TABLET ORAL at 20:09

## 2019-01-01 RX ADMIN — FAMOTIDINE 20 MG: 20 TABLET ORAL at 20:01

## 2019-01-01 RX ADMIN — METHYLPHENIDATE HYDROCHLORIDE 10 MG: 5 TABLET ORAL at 11:26

## 2019-01-01 RX ADMIN — FOLIC ACID 1 MG: 1 TABLET ORAL at 10:41

## 2019-01-01 RX ADMIN — DEXTROSE MONOHYDRATE: 50 INJECTION, SOLUTION INTRAVENOUS at 09:46

## 2019-01-01 RX ADMIN — QUETIAPINE FUMARATE 25 MG: 25 TABLET ORAL at 20:22

## 2019-01-01 RX ADMIN — Medication 100 MG: at 08:54

## 2019-01-01 RX ADMIN — LOSARTAN POTASSIUM 50 MG: 50 TABLET, FILM COATED ORAL at 10:12

## 2019-01-01 RX ADMIN — METHYLPHENIDATE HYDROCHLORIDE 10 MG: 5 TABLET ORAL at 06:32

## 2019-01-01 RX ADMIN — METHYLPHENIDATE HYDROCHLORIDE 10 MG: 5 TABLET ORAL at 05:22

## 2019-01-01 RX ADMIN — ZIPRASIDONE MESYLATE 10 MG: 20 INJECTION, POWDER, LYOPHILIZED, FOR SOLUTION INTRAMUSCULAR at 20:54

## 2019-01-01 RX ADMIN — TAMSULOSIN HYDROCHLORIDE 0.4 MG: 0.4 CAPSULE ORAL at 08:51

## 2019-01-01 RX ADMIN — Medication 2 MG: at 13:40

## 2019-01-01 RX ADMIN — FAMOTIDINE 20 MG: 20 TABLET ORAL at 10:13

## 2019-01-01 RX ADMIN — TRAZODONE HYDROCHLORIDE 100 MG: 100 TABLET ORAL at 20:01

## 2019-01-01 RX ADMIN — NYSTATIN 500000 UNITS: 100000 SUSPENSION ORAL at 19:54

## 2019-01-01 RX ADMIN — TRAZODONE HYDROCHLORIDE 100 MG: 100 TABLET ORAL at 23:55

## 2019-01-01 RX ADMIN — QUETIAPINE FUMARATE 37.5 MG: 25 TABLET ORAL at 12:55

## 2019-01-01 RX ADMIN — BROMOCRIPTINE MESYLATE 5 MG: 2.5 TABLET ORAL at 10:04

## 2019-01-01 RX ADMIN — NYSTATIN 500000 UNITS: 100000 SUSPENSION ORAL at 08:58

## 2019-01-01 RX ADMIN — FAMOTIDINE 20 MG: 20 TABLET ORAL at 19:46

## 2019-01-01 RX ADMIN — ENOXAPARIN SODIUM 40 MG: 100 INJECTION SUBCUTANEOUS at 10:04

## 2019-01-01 RX ADMIN — Medication 100 MG: at 10:12

## 2019-01-01 RX ADMIN — METHYLPHENIDATE HYDROCHLORIDE 10 MG: 5 TABLET ORAL at 10:13

## 2019-01-01 RX ADMIN — METHYLPHENIDATE HYDROCHLORIDE 10 MG: 10 TABLET ORAL at 12:19

## 2019-01-01 RX ADMIN — TRAZODONE HYDROCHLORIDE 100 MG: 100 TABLET ORAL at 20:22

## 2019-01-01 RX ADMIN — ENOXAPARIN SODIUM 40 MG: 100 INJECTION SUBCUTANEOUS at 08:38

## 2019-01-01 RX ADMIN — Medication 100 MG: at 19:46

## 2019-01-01 RX ADMIN — Medication 100 MG: at 09:15

## 2019-01-01 RX ADMIN — FAMOTIDINE 20 MG: 20 TABLET ORAL at 21:40

## 2019-01-01 RX ADMIN — BROMOCRIPTINE MESYLATE 5 MG: 2.5 TABLET ORAL at 05:41

## 2019-01-01 RX ADMIN — PSYLLIUM HUSK 1 PACKET: 3.4 GRANULE ORAL at 08:56

## 2019-01-01 RX ADMIN — SULFAMETHOXAZOLE AND TRIMETHOPRIM 1 TABLET: 800; 160 TABLET ORAL at 08:38

## 2019-01-01 RX ADMIN — FOLIC ACID 1 MG: 1 TABLET ORAL at 07:57

## 2019-01-01 RX ADMIN — FAMOTIDINE 20 MG: 20 TABLET ORAL at 20:30

## 2019-01-01 RX ADMIN — ENOXAPARIN SODIUM 40 MG: 100 INJECTION SUBCUTANEOUS at 08:54

## 2019-01-01 RX ADMIN — PIPERACILLIN AND TAZOBACTAM 3.38 G: 3; .375 INJECTION, POWDER, LYOPHILIZED, FOR SOLUTION INTRAVENOUS at 20:19

## 2019-01-01 RX ADMIN — FOLIC ACID 1 MG: 1 TABLET ORAL at 09:22

## 2019-01-01 RX ADMIN — SULFAMETHOXAZOLE AND TRIMETHOPRIM 1 TABLET: 800; 160 TABLET ORAL at 20:09

## 2019-01-01 RX ADMIN — QUETIAPINE FUMARATE 25 MG: 25 TABLET ORAL at 16:55

## 2019-01-01 RX ADMIN — BARIUM SULFATE 20 ML: 400 SUSPENSION ORAL at 08:26

## 2019-01-01 RX ADMIN — PSYLLIUM HUSK 1 PACKET: 3.4 GRANULE ORAL at 17:55

## 2019-01-01 RX ADMIN — FAMOTIDINE 20 MG: 20 TABLET ORAL at 08:13

## 2019-01-01 RX ADMIN — FOLIC ACID 1 MG: 1 TABLET ORAL at 09:35

## 2019-01-01 RX ADMIN — SENNOSIDES 17.2 MG: 8.6 TABLET, FILM COATED ORAL at 08:33

## 2019-01-01 RX ADMIN — BUSPIRONE HYDROCHLORIDE 5 MG: 5 TABLET ORAL at 09:09

## 2019-01-01 RX ADMIN — FOLIC ACID 1 MG: 1 TABLET ORAL at 10:04

## 2019-01-01 RX ADMIN — BUSPIRONE HYDROCHLORIDE 5 MG: 5 TABLET ORAL at 07:59

## 2019-01-01 RX ADMIN — QUETIAPINE FUMARATE 25 MG: 25 TABLET ORAL at 17:41

## 2019-01-01 RX ADMIN — FAMOTIDINE 20 MG: 20 TABLET ORAL at 20:00

## 2019-01-01 RX ADMIN — FAMOTIDINE 20 MG: 20 TABLET ORAL at 08:56

## 2019-01-01 RX ADMIN — TRAZODONE HYDROCHLORIDE 100 MG: 100 TABLET ORAL at 19:13

## 2019-01-01 RX ADMIN — SULFAMETHOXAZOLE AND TRIMETHOPRIM 1 TABLET: 800; 160 TABLET ORAL at 21:19

## 2019-01-01 RX ADMIN — QUETIAPINE FUMARATE 25 MG: 25 TABLET ORAL at 17:10

## 2019-01-01 RX ADMIN — SENNOSIDES 17.2 MG: 8.6 TABLET, FILM COATED ORAL at 20:42

## 2019-01-01 RX ADMIN — HEPARIN SODIUM 5000 UNITS: 5000 INJECTION INTRAVENOUS; SUBCUTANEOUS at 12:29

## 2019-01-01 RX ADMIN — BUSPIRONE HYDROCHLORIDE 5 MG: 5 TABLET ORAL at 23:55

## 2019-01-01 RX ADMIN — METOPROLOL TARTRATE 25 MG: 25 TABLET ORAL at 07:57

## 2019-01-01 RX ADMIN — ENOXAPARIN SODIUM 40 MG: 100 INJECTION SUBCUTANEOUS at 09:27

## 2019-01-01 RX ADMIN — PSYLLIUM HUSK 1 PACKET: 3.4 GRANULE ORAL at 10:58

## 2019-01-01 RX ADMIN — FAMOTIDINE 20 MG: 20 TABLET ORAL at 11:14

## 2019-01-01 RX ADMIN — BUSPIRONE HYDROCHLORIDE 5 MG: 5 TABLET ORAL at 10:41

## 2019-01-01 RX ADMIN — BUSPIRONE HYDROCHLORIDE 5 MG: 5 TABLET ORAL at 20:14

## 2019-01-01 RX ADMIN — ENOXAPARIN SODIUM 40 MG: 100 INJECTION SUBCUTANEOUS at 08:34

## 2019-01-01 RX ADMIN — HEPARIN SODIUM 5000 UNITS: 5000 INJECTION INTRAVENOUS; SUBCUTANEOUS at 09:36

## 2019-01-01 RX ADMIN — FAMOTIDINE 20 MG: 20 TABLET ORAL at 19:58

## 2019-01-01 RX ADMIN — FAMOTIDINE 20 MG: 20 TABLET ORAL at 08:48

## 2019-01-01 RX ADMIN — FAMOTIDINE 20 MG: 20 TABLET ORAL at 09:16

## 2019-01-01 RX ADMIN — FAMOTIDINE 20 MG: 20 TABLET ORAL at 08:58

## 2019-01-01 RX ADMIN — FOLIC ACID 1 MG: 1 TABLET ORAL at 10:02

## 2019-01-01 RX ADMIN — PIPERACILLIN AND TAZOBACTAM 3.38 G: 3; .375 INJECTION, POWDER, LYOPHILIZED, FOR SOLUTION INTRAVENOUS at 11:20

## 2019-01-01 RX ADMIN — WATER 1.2 ML: 1 INJECTION INTRAMUSCULAR; INTRAVENOUS; SUBCUTANEOUS at 19:36

## 2019-01-01 RX ADMIN — BROMOCRIPTINE MESYLATE 5 MG: 2.5 TABLET ORAL at 06:22

## 2019-01-01 RX ADMIN — QUETIAPINE FUMARATE 37.5 MG: 25 TABLET ORAL at 03:51

## 2019-01-01 RX ADMIN — BROMOCRIPTINE MESYLATE 5 MG: 2.5 TABLET ORAL at 06:10

## 2019-01-01 RX ADMIN — SULFAMETHOXAZOLE AND TRIMETHOPRIM 1 TABLET: 800; 160 TABLET ORAL at 10:04

## 2019-01-01 RX ADMIN — HEPARIN SODIUM 5000 UNITS: 5000 INJECTION INTRAVENOUS; SUBCUTANEOUS at 07:57

## 2019-01-01 RX ADMIN — TAMSULOSIN HYDROCHLORIDE 0.8 MG: 0.4 CAPSULE ORAL at 19:53

## 2019-01-01 RX ADMIN — FAMOTIDINE 20 MG: 20 TABLET, FILM COATED ORAL at 10:58

## 2019-01-01 RX ADMIN — Medication 100 MG: at 10:04

## 2019-01-01 RX ADMIN — NYSTATIN 500000 UNITS: 100000 SUSPENSION ORAL at 14:20

## 2019-01-01 RX ADMIN — DOXAZOSIN 4 MG: 4 TABLET ORAL at 11:30

## 2019-01-01 RX ADMIN — TAMSULOSIN HYDROCHLORIDE 0.4 MG: 0.4 CAPSULE ORAL at 20:20

## 2019-01-01 RX ADMIN — TRAZODONE HYDROCHLORIDE 100 MG: 100 TABLET ORAL at 20:14

## 2019-01-01 RX ADMIN — LOSARTAN POTASSIUM 50 MG: 50 TABLET, FILM COATED ORAL at 08:50

## 2019-01-01 RX ADMIN — LACTULOSE 20 G: 20 SOLUTION ORAL at 20:38

## 2019-01-01 RX ADMIN — METHYLPHENIDATE HYDROCHLORIDE 10 MG: 10 TABLET ORAL at 10:42

## 2019-01-01 SDOH — HEALTH STABILITY: MENTAL HEALTH: HOW OFTEN DO YOU HAVE A DRINK CONTAINING ALCOHOL?: 4 OR MORE TIMES A WEEK

## 2019-01-01 SDOH — HEALTH STABILITY: MENTAL HEALTH: HOW MANY STANDARD DRINKS CONTAINING ALCOHOL DO YOU HAVE ON A TYPICAL DAY?: 3 OR 4

## 2019-01-01 ASSESSMENT — PAIN SCALES - GENERAL
PAINLEVEL_OUTOF10: 0
PAINLEVEL_OUTOF10: 4
PAINLEVEL_OUTOF10: 3
PAINLEVEL_OUTOF10: 0
PAINLEVEL_OUTOF10: 1
PAINLEVEL_OUTOF10: 0
PAINLEVEL_OUTOF10: 1
PAINLEVEL_OUTOF10: 0
PAINLEVEL_OUTOF10: 1
PAINLEVEL_OUTOF10: 0
PAINLEVEL_OUTOF10: 2
PAINLEVEL_OUTOF10: 0
PAINLEVEL_OUTOF10: 8
PAINLEVEL_OUTOF10: 0
PAINLEVEL_OUTOF10: 2
PAINLEVEL_OUTOF10: 0
PAINLEVEL_OUTOF10: 1
PAINLEVEL_OUTOF10: 0

## 2019-01-01 ASSESSMENT — PAIN SCALES - WONG BAKER
WONGBAKER_NUMERICALRESPONSE: 0

## 2019-01-01 ASSESSMENT — PAIN - FUNCTIONAL ASSESSMENT
PAIN_FUNCTIONAL_ASSESSMENT: ACTIVITIES ARE NOT PREVENTED

## 2019-01-01 ASSESSMENT — ENCOUNTER SYMPTOMS
DIARRHEA: 0
SHORTNESS OF BREATH: 0
COUGH: 0
ABDOMINAL PAIN: 0
ABDOMINAL DISTENTION: 0
GASTROINTESTINAL NEGATIVE: 1
EYES NEGATIVE: 1

## 2019-01-01 ASSESSMENT — 9 HOLE PEG TEST
TESTTIME_SECONDS: 82
TEST_RESULT: IMPAIRED
TEST_RESULT: IMPAIRED
TESTTIME_SECONDS: 105

## 2019-10-16 PROBLEM — S06.5XAA SDH (SUBDURAL HEMATOMA): Status: ACTIVE | Noted: 2019-01-01

## 2019-10-16 PROBLEM — E43 SEVERE MALNUTRITION (HCC): Status: ACTIVE | Noted: 2019-01-01

## 2019-11-07 PROBLEM — L98.9 SKIN LESION OF SCALP: Chronic | Status: ACTIVE | Noted: 2019-01-01

## 2019-11-18 PROBLEM — Z87.898 H/O URINARY RETENTION: Status: ACTIVE | Noted: 2019-01-01

## 2019-11-18 PROBLEM — K94.23 PEG TUBE MALFUNCTION (HCC): Status: ACTIVE | Noted: 2019-01-01

## 2019-11-18 PROBLEM — J96.01 ACUTE RESPIRATORY FAILURE WITH HYPOXIA (HCC): Status: ACTIVE | Noted: 2019-01-01

## 2019-11-18 PROBLEM — R11.2 NON-INTRACTABLE VOMITING WITH NAUSEA: Status: ACTIVE | Noted: 2019-01-01

## 2019-11-18 PROBLEM — E86.0 DEHYDRATION: Status: ACTIVE | Noted: 2019-01-01

## 2019-12-18 PROBLEM — E86.0 DEHYDRATION: Status: RESOLVED | Noted: 2019-01-01 | Resolved: 2019-01-01

## 2020-01-01 ENCOUNTER — OFFICE VISIT (OUTPATIENT)
Dept: NEPHROLOGY | Age: 80
End: 2020-01-01
Payer: MEDICARE

## 2020-01-01 ENCOUNTER — OFFICE VISIT (OUTPATIENT)
Dept: CARDIOLOGY CLINIC | Age: 80
End: 2020-01-01
Payer: MEDICARE

## 2020-01-01 VITALS — OXYGEN SATURATION: 98 % | DIASTOLIC BLOOD PRESSURE: 78 MMHG | SYSTOLIC BLOOD PRESSURE: 132 MMHG | HEART RATE: 98 BPM

## 2020-01-01 VITALS
BODY MASS INDEX: 19.27 KG/M2 | HEIGHT: 72 IN | HEART RATE: 76 BPM | DIASTOLIC BLOOD PRESSURE: 78 MMHG | SYSTOLIC BLOOD PRESSURE: 130 MMHG

## 2020-01-01 PROCEDURE — 99213 OFFICE O/P EST LOW 20 MIN: CPT | Performed by: INTERNAL MEDICINE

## 2020-01-01 PROCEDURE — 4040F PNEUMOC VAC/ADMIN/RCVD: CPT | Performed by: PHYSICIAN ASSISTANT

## 2020-01-01 PROCEDURE — 1036F TOBACCO NON-USER: CPT | Performed by: INTERNAL MEDICINE

## 2020-01-01 PROCEDURE — G8427 DOCREV CUR MEDS BY ELIG CLIN: HCPCS | Performed by: INTERNAL MEDICINE

## 2020-01-01 PROCEDURE — 1123F ACP DISCUSS/DSCN MKR DOCD: CPT | Performed by: INTERNAL MEDICINE

## 2020-01-01 PROCEDURE — G8484 FLU IMMUNIZE NO ADMIN: HCPCS | Performed by: INTERNAL MEDICINE

## 2020-01-01 PROCEDURE — 4040F PNEUMOC VAC/ADMIN/RCVD: CPT | Performed by: INTERNAL MEDICINE

## 2020-01-01 PROCEDURE — G8420 CALC BMI NORM PARAMETERS: HCPCS | Performed by: INTERNAL MEDICINE

## 2020-01-01 PROCEDURE — G8427 DOCREV CUR MEDS BY ELIG CLIN: HCPCS | Performed by: PHYSICIAN ASSISTANT

## 2020-01-01 PROCEDURE — G8484 FLU IMMUNIZE NO ADMIN: HCPCS | Performed by: PHYSICIAN ASSISTANT

## 2020-01-01 PROCEDURE — 1123F ACP DISCUSS/DSCN MKR DOCD: CPT | Performed by: PHYSICIAN ASSISTANT

## 2020-01-01 PROCEDURE — G8420 CALC BMI NORM PARAMETERS: HCPCS | Performed by: PHYSICIAN ASSISTANT

## 2020-01-01 PROCEDURE — 99213 OFFICE O/P EST LOW 20 MIN: CPT | Performed by: PHYSICIAN ASSISTANT

## 2020-01-01 PROCEDURE — 1036F TOBACCO NON-USER: CPT | Performed by: PHYSICIAN ASSISTANT

## 2020-01-01 RX ORDER — ACETAMINOPHEN 325 MG/1
650 TABLET ORAL EVERY 6 HOURS PRN
COMMUNITY

## 2020-01-01 RX ORDER — METHYLPHENIDATE HYDROCHLORIDE 10 MG/1
10 TABLET ORAL 2 TIMES DAILY
COMMUNITY
End: 2020-01-01

## 2020-01-01 RX ORDER — LORAZEPAM 0.5 MG/1
0.5 TABLET ORAL EVERY 4 HOURS PRN
COMMUNITY

## 2020-01-01 RX ORDER — BISACODYL 10 MG
10 SUPPOSITORY, RECTAL RECTAL DAILY PRN
COMMUNITY

## 2020-01-07 NOTE — PROGRESS NOTES
[facility]        Outpatient Follow-Up note         1/7/2020 11:49 AM    Patient Name:   Vinayak Emery  YOB: 1940  Primary Care Physician:  No primary care provider on file. Chief Complaint / Reason for follow-up : Follow Up of recent hospital discharge     Interval History :  Patient seen and examined by me.   Feels okay poor historian  Does not appear to be in any acute distress   Patient was recently in the hospital for acute kidney injury due to prerenal combined with the use of IV contrast his creatinine was as high as 3.6 which has improved with hydration     Past History :  Past Medical History:   Diagnosis Date    Acute urinary retention 09/2019    Agitation     Alcohol abuse     Anxiety     Dysphagia     Encounter for prophylactic Rho(d) immune globulin     GERD (gastroesophageal reflux disease)     HTN (hypertension)     Hx of tracheostomy 09/2019    Insomnia     Presbycusis of both ears     S/P percutaneous endoscopic gastrostomy (PEG) tube placement ([address] 09/2019    SDH (subdural hematoma) ([address] 04/10/2014    Severe malnutrition ([address] 10/16/2019    Sinus pause 10/2019    Subarachnoid hemorrhage, traumatic ([address] 09/2019    bilateral supratentorial    TBI (traumatic brain injury) ([address]     Urinary retention      Past Surgical History:   Procedure Laterality Date    CRANIOTOMY  04/10/2014    Bilateral [place] and evacuation of subdural hematoma     GASTROSTOMY TUBE PLACEMENT N/A 10/14/2019    EGD PEG TUBE PLACEMENT performed by Francesco Wiggins MD at [address] [facility] Left 2014        Medications :     Outpatient Medications Marked as Taking for the 1/7/20 encounter (Office Visit) with Mekhi Abernathy MD   Medication Sig Dispense Refill    acetaminophen (TYLENOL) 325 MG tablet Take 650 mg by mouth every 6 hours as needed for Pain      bisacodyl (DULCOLAX) 10 MG suppository Place 10 mg rectally daily as needed for Renal Ultrasound Scan -- not done       Assessment    1. Renal -patient most likely has chronic kidney disease stage I mostly due to the presence of some mild protein in the urine  -We will quantify the protein in the next visit   -We will also obtain a baseline renal ultrasound scan, due to presence of some blood  2. Electrolytes appear to be within normal limits  3. Essential hypertension running well  4. Proteinuria will quantify in the next visit  5.  microscopic hematuria check a renal ultrasound scan to rule out any malignancy  6. meds reviewed and D/W patient  7. Follow-up in 6 months    Tests and orders placed this Encounter     Orders Placed This Encounter   Procedures    US Renal Kidney    Basic Metabolic Panel    MICROALBUMIN, RANDOM URINE (W/O CREATININE)    Protein, urine, random    Urinalysis with Microscopic    Creatinine, Random Urine       [facility]RE[facility].

## 2020-01-08 NOTE — PROGRESS NOTES
Patient here for 6 wk fu. Patient denies SOB, chest pain, dizziness, lightheadedness, palpations and DORENE.

## 2020-01-08 NOTE — PROGRESS NOTES
[facility] PROFESSIONAL SERVICES  [facility] [address]   [address] [address]   Dept: [phone]   Dept Fax: [phone]   Loc: [phone]      Chief Complaint   Patient presents with    Follow-up     6 wk fu     Patient presents for follow appointment after wearing ZIO patch. The ZIO Patch showed that he does have paroxysmal atrial fibrillation. He denies any prior history of this. He is a poor historian. He does have a history of a intracranial hemorrhage due to fall last year with extensive rehabilitation after that. He states he is never taken aspirin. He did have another fall in December with a shoulder strain. He denies any chest pain, shortness of breath or palpitations. He has not had any issues with lightheadedness syncope or near syncope.         General:   No fever, no chills, No fatigue or weight loss  Pulmonary:    No dyspnea, no wheezing  Cardiac:    Denies recent chest pain   GI:     No nausea or vomiting, no abdominal pain  Neuro:    No dizziness or light headedness  Musculoskeletal:  No recent active issues  Extremities:   No edema, good peripheral pulses      Past Medical History:   Diagnosis Date    Acute urinary retention 09/2019    Agitation     Alcohol abuse     Anxiety     Dysphagia     Encounter for prophylactic Rho(d) immune globulin     GERD (gastroesophageal reflux disease)     HTN (hypertension)     Hx of tracheostomy 09/2019    Insomnia     Presbycusis of both ears     S/P percutaneous endoscopic gastrostomy (PEG) tube placement ([address] 09/2019    SDH (subdural hematoma) ([address] 04/10/2014    Severe malnutrition ([address] 10/16/2019    Sinus pause 10/2019    Subarachnoid hemorrhage, traumatic ([address] 09/2019    bilateral supratentorial    TBI (traumatic brain injury) ([address]     Urinary retention        No Known Allergies    Current Outpatient Medications   Medication Sig Dispense Refill    acetaminophen (TYLENOL) 325 MG tablet Take 650 mg by mouth every 6 hours as needed for Pain      bisacodyl (DULCOLAX) 10 MG suppository Place 10 mg rectally daily as needed for Constipation      LORazepam (ATIVAN) 0.5 MG tablet Take 0.5 mg by mouth every 4 hours as needed for Anxiety.  doxazosin (CARDURA) 2 MG tablet Take 1 tablet by mouth daily 30 tablet 3    metoprolol tartrate (LOPRESSOR) 25 MG tablet Take 1 tablet by mouth 2 times daily 60 tablet 3    famotidine (PEPCID) 20 MG tablet 1 tablet by PEG Tube route 2 times daily 60 tablet 3    folic acid (FOLVITE) 1 MG tablet 1 tablet by PEG Tube route daily (Patient taking differently: Take 1 mg by mouth daily ) 30 tablet 3    ipratropium-albuterol (DUONEB) 0.5-2.5 (3) MG/3ML SOLN nebulizer solution Inhale 3 mLs into the lungs every 4 hours as needed for Shortness of Breath (for wheezing and SOB) 360 mL     vitamin B-1 100 MG tablet 1 tablet by Per G Tube route daily (Patient taking differently: Take 100 mg by mouth daily ) 30 tablet 3    traZODone (DESYREL) 100 MG tablet 1 tablet by PEG Tube route nightly (Patient taking differently: Take 100 mg by mouth nightly )      docusate (COLACE) 50 MG/5ML liquid 10 mLs by Per G Tube route 2 times daily as needed (Constipation)      busPIRone (BUSPAR) 5 MG tablet Take 1 tablet by mouth 2 times daily      loperamide (IMODIUM) 1 MG/7.5ML SUSP suspension 15 mLs by Per G Tube route 4 times daily as needed (Loose stools) (Patient taking differently: Take 2 mg by mouth 4 times daily as needed (Loose stools) ) 300 mL      No current facility-administered medications for this visit.         Social History     Socioeconomic History    Marital status: Unknown     Spouse name: None    Number of children: 1    Years of education: None    Highest education level: None   Occupational History    None   Social Needs    Financial resource strain: None    Food insecurity:     Worry: None     Inability: None    Transportation needs:     Medical: None Non-medical: None   Tobacco Use    Smoking status: Never Smoker    Smokeless tobacco: Never Used   Substance and Sexual Activity    Alcohol use: Not Currently     Frequency: 4 or more times a week     Drinks per session: 3 or 4    Drug use: Not Currently    Sexual activity: Not Currently   Lifestyle    Physical activity:     Days per week: None     Minutes per session: None    Stress: None   Relationships    Social connections:     Talks on phone: None     Gets together: None     Attends Restorationism service: None     Active member of club or organization: None     Attends meetings of clubs or organizations: None     Relationship status: None    Intimate partner violence:     Fear of current or ex partner: None     Emotionally abused: None     Physically abused: None     Forced sexual activity: None   Other Topics Concern    None   Social History Narrative    None       Family History   Problem Relation Age of Onset    Lung Cancer Brother        Blood pressure 130/78, pulse 76, height 6' (1.829 m). General:   Well developed, well nourished  Lungs:   Clear to auscultation  Heart:    Normal S1 S2, No murmur, rubs, or gallops  Abdomen:   Soft, non tender, no organomegalies, positive bowel sounds  Extremities:   No edema, no cyanosis, good peripheral pulses  Neurological:   Awake, alert, oriented. No obvious focal deficits  Musculoskeletal:  No obvious deformities      ZIO Patch 11/11/2019  CONCLUSION:  1. Sinus rhythm alternating with episodes of what looks like atrial  fibrillation with rapid ventricular response. 2.  Abnormal event monitor. Diagnosis Orders   1. Paroxysmal A-fib [facility])  External Referral To Neurosurgery   2. History of intracranial hemorrhage     3.  At high risk for injury related to fall         Orders Placed This Encounter   Procedures    External Referral To Neurosurgery     Referral Priority:   Routine     Referral Type:   Eval and Treat     Referral Reason:   Specialty Services Required     Referred to Provider:   Ness Vasquez MD     Requested Specialty:   Neurosurgery     Number of Visits Requested:   1     I would like him to be seen by Dr. Lynn Hutchins to get clearance for aspirin. I discussed with the patient that I feel that he is at high risk for oral anticoagulants. I discussed the risk and benefits of oral anticoagulants and he understands that he is at increased risk of embolic phenomenon. He understands and accepts the risk. Continue same current medications and with constant vigilance to changes in symptoms and also any potential side effects. Return for care if become worse or seek medical attention immediately. The patient is educated on symptoms of heart disease that include chest pain and passing out, dizziness, etc and to report them if there is any change or go to emergency room.          Follow up with myself as scheduled or sooner if needed  (Please note that portions of this note were completed with a voice recognition program.  Efforts were made to edit the dictation but occasionally words are mis-transcribed.)      Veronica Marroquin PA-C

## (undated) DEVICE — SOLUTION IV IRRIG WATER 1000ML POUR BRL 2F7114

## (undated) DEVICE — SWABSTICK MEDICATED 10% POVIDONE IOD PVP SGL ANTISEP SAT

## (undated) DEVICE — CONTAINER,SPECIMEN,PNEU TUBE,4OZ,OR STRL: Brand: MEDLINE

## (undated) DEVICE — TUBE FEED GASTROSTOMY MIC 20FR